# Patient Record
Sex: FEMALE | Race: WHITE
[De-identification: names, ages, dates, MRNs, and addresses within clinical notes are randomized per-mention and may not be internally consistent; named-entity substitution may affect disease eponyms.]

---

## 2019-08-17 ENCOUNTER — HOSPITAL ENCOUNTER (INPATIENT)
Dept: HOSPITAL 4 - SED | Age: 84
LOS: 5 days | Discharge: PSYCHIATRIC OR SUBSTANCE USE FACILITY | DRG: 551 | End: 2019-08-22
Attending: INTERNAL MEDICINE | Admitting: INTERNAL MEDICINE
Payer: COMMERCIAL

## 2019-08-17 VITALS — SYSTOLIC BLOOD PRESSURE: 147 MMHG

## 2019-08-17 VITALS — WEIGHT: 139 LBS | HEIGHT: 60 IN | BODY MASS INDEX: 27.29 KG/M2

## 2019-08-17 VITALS — SYSTOLIC BLOOD PRESSURE: 94 MMHG

## 2019-08-17 VITALS — SYSTOLIC BLOOD PRESSURE: 129 MMHG

## 2019-08-17 DIAGNOSIS — Z90.89: ICD-10-CM

## 2019-08-17 DIAGNOSIS — E11.9: ICD-10-CM

## 2019-08-17 DIAGNOSIS — Z85.6: ICD-10-CM

## 2019-08-17 DIAGNOSIS — E87.1: ICD-10-CM

## 2019-08-17 DIAGNOSIS — F03.90: ICD-10-CM

## 2019-08-17 DIAGNOSIS — R62.7: ICD-10-CM

## 2019-08-17 DIAGNOSIS — E43: ICD-10-CM

## 2019-08-17 DIAGNOSIS — G89.4: ICD-10-CM

## 2019-08-17 DIAGNOSIS — M47.896: Primary | ICD-10-CM

## 2019-08-17 DIAGNOSIS — G93.40: ICD-10-CM

## 2019-08-17 DIAGNOSIS — I48.2: ICD-10-CM

## 2019-08-17 DIAGNOSIS — E03.9: ICD-10-CM

## 2019-08-17 DIAGNOSIS — R29.6: ICD-10-CM

## 2019-08-17 DIAGNOSIS — I47.1: ICD-10-CM

## 2019-08-17 DIAGNOSIS — Z82.49: ICD-10-CM

## 2019-08-17 DIAGNOSIS — Z79.899: ICD-10-CM

## 2019-08-17 DIAGNOSIS — Z91.041: ICD-10-CM

## 2019-08-17 LAB
ALBUMIN SERPL BCP-MCNC: 2.4 G/DL (ref 3.4–4.8)
ALT SERPL W P-5'-P-CCNC: 5 U/L (ref 12–78)
AMYLASE SERPL-CCNC: 20 U/L (ref 0–100)
ANION GAP SERPL CALCULATED.3IONS-SCNC: 6 MMOL/L (ref 5–15)
AST SERPL W P-5'-P-CCNC: 9 U/L (ref 10–37)
BASOPHILS # BLD AUTO: 0 K/UL (ref 0–0.2)
BASOPHILS NFR BLD AUTO: 0.2 % (ref 0–2)
BILIRUB SERPL-MCNC: 0.7 MG/DL (ref 0–1)
BUN SERPL-MCNC: 12 MG/DL (ref 8–21)
CALCIUM SERPL-MCNC: 9.3 MG/DL (ref 8.4–11)
CHLORIDE SERPL-SCNC: 95 MMOL/L (ref 98–107)
CHOLEST SERPL-MCNC: 174 MG/DL (ref ?–200)
CREAT SERPL-MCNC: 0.59 MG/DL (ref 0.55–1.3)
EOSINOPHIL # BLD AUTO: 0 K/UL (ref 0–0.4)
EOSINOPHIL NFR BLD AUTO: 0.9 % (ref 0–4)
ERYTHROCYTE [DISTWIDTH] IN BLOOD BY AUTOMATED COUNT: 17 % (ref 9–15)
ETHANOL SERPL-MCNC: < 3 MG/DL (ref ?–10)
GFR SERPL CREATININE-BSD FRML MDRD: (no result) ML/MIN (ref 90–?)
GLUCOSE SERPL-MCNC: 109 MG/DL (ref 70–99)
HCT VFR BLD AUTO: 38.3 % (ref 36–48)
HDLC SERPL-MCNC: 42 MG/DL (ref 55–?)
HGB BLD-MCNC: 12.7 G/DL (ref 12–16)
INR PPP: 1 (ref 0.8–1.2)
LDL CHOLESTEROL: 108 MG/DL (ref ?–100)
LIPASE SERPL-CCNC: 59 U/L (ref 73–393)
LYMPHOCYTES # BLD AUTO: 0.4 K/UL (ref 1–5.5)
LYMPHOCYTES NFR BLD AUTO: 8.4 % (ref 20.5–51.5)
MCH RBC QN AUTO: 27 PG (ref 27–31)
MCHC RBC AUTO-ENTMCNC: 33 % (ref 32–36)
MCV RBC AUTO: 81 FL (ref 79–98)
MONOCYTES # BLD MANUAL: 0.3 K/UL (ref 0–1)
MONOCYTES # BLD MANUAL: 6.1 % (ref 1.7–9.3)
NEUTROPHILS # BLD AUTO: 4.5 K/UL (ref 1.8–7.7)
NEUTROPHILS NFR BLD AUTO: 84.4 % (ref 40–70)
PLATELET # BLD AUTO: 245 K/UL (ref 130–430)
POTASSIUM SERPL-SCNC: 4.2 MMOL/L (ref 3.5–5.1)
PROTHROMBIN TIME: 10.3 SECS (ref 9.5–12.5)
RBC # BLD AUTO: 4.75 MIL/UL (ref 4.2–6.2)
SODIUM SERPLBLD-SCNC: 127 MMOL/L (ref 136–145)
TRIGL SERPL-MCNC: 125 MG/DL (ref 30–150)
WBC # BLD AUTO: 5.3 K/UL (ref 4.8–10.8)

## 2019-08-17 PROCEDURE — G0378 HOSPITAL OBSERVATION PER HR: HCPCS

## 2019-08-17 PROCEDURE — G0481 DRUG TEST DEF 8-14 CLASSES: HCPCS

## 2019-08-17 PROCEDURE — G0482 DRUG TEST DEF 15-21 CLASSES: HCPCS

## 2019-08-17 RX ADMIN — SODIUM CHLORIDE SCH MLS/HR: 9 INJECTION, SOLUTION INTRAVENOUS at 16:30

## 2019-08-17 NOTE — NUR
CHANGE OF SHIFT;

pt. confused, restless, removed her tele cables, her gown and found on the floor. pt. able 
to verbally response, she knows her name. in no acute distress. on fall risk, safety 
precautions in place. call light at bedside.

## 2019-08-17 NOTE — NUR
NOTES:

pt. removed gown and tele monitor again. pt. talking to herself. reoriented. repositioned. 
IVF patent. closely monitored.

## 2019-08-17 NOTE — NUR
Patient will be admitted to care of Dr. Mcmanus.  Admitted to Telemetry unit.  
Will go to room 104B.  Belongings list completed.  Summary report printed. 
Report will be given at bedside.

## 2019-08-17 NOTE — NUR
ADMISSION NOTE

Received patient from ER via lokesh, received report from REMIGIO FREEMAN. Patient admitted with 
diagnosis of HYPONATREMIA. Patient oriented to hospital routine, call light, toileting and 
safety-patient verbalized understanding.

## 2019-08-17 NOTE — NUR
Patient is drowsy but arousable answer simple question,with continuos IV  fluid for 
hydration, safety/fall precaution initiated, will monitor.

## 2019-08-17 NOTE — NUR
Patient is more awake ate her dinner tolerates 50% with out aspiration, safety/fall 
precaution initiated verbalized understanding.

## 2019-08-17 NOTE — NUR
Patient bib BLS  after being dc from [facility] s/p fall five days ago. 
According BLS she ahs not been able to move since being dc'd from [facility] 
arrives with multiple c/o.
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
## 2019-08-17 NOTE — NUR
NOTES:

Dr. Mcmanus came and checked pt. pretty calm right now but informed MD that she gets restless 
on and off . pt. is disoriented but verbally responsive. safety precautions in place due to 
frequent falls.

## 2019-08-18 VITALS — SYSTOLIC BLOOD PRESSURE: 115 MMHG

## 2019-08-18 VITALS — SYSTOLIC BLOOD PRESSURE: 96 MMHG

## 2019-08-18 VITALS — SYSTOLIC BLOOD PRESSURE: 117 MMHG

## 2019-08-18 VITALS — SYSTOLIC BLOOD PRESSURE: 144 MMHG

## 2019-08-18 VITALS — SYSTOLIC BLOOD PRESSURE: 125 MMHG

## 2019-08-18 RX ADMIN — PANTOPRAZOLE SODIUM SCH MG: 40 TABLET, DELAYED RELEASE ORAL at 06:56

## 2019-08-18 RX ADMIN — NYSTATIN AND TRIAMCINOLONE ACETONIDE SCH GM: 100000; 1 CREAM TOPICAL at 21:00

## 2019-08-18 NOTE — NUR
DC PLANNING

Went to pt's bedside x2, in am & afternoon to do dc planning eval, both times pt sleeping, 
would not wake up on calling pt's name. CM/ to f/u when pt more awake.

## 2019-08-18 NOTE — NUR
CONSULTATION PAGED/CALLED

Reason for Consultation: []  PSYCHOSIS

Person Who was Notified: []  ANU

Consulting Physician: []   DR HITESH LORENZO

Consultant Specialty: []  PSYCH

Ordering Physician: []  DR JENNA MEIER

## 2019-08-18 NOTE — NUR
NOTE



PATIENT IS RESTING IN BED, STABLE, NO SIGNS OF RESPIRATORY DISTRESS. CALL LIGHT IS WITHIN 
REACH. BED IS LOCKED, ALARMED, AND AT THE LOWEST LEVEL.

## 2019-08-18 NOTE — NUR
CONSULTATION PAGED/CALLED

Reason for Consultation: AF

Person Who was Notified: TANIKA

Consulting Physician: HOWARD ROSENTHAL IS ON CALL

Consultant Specialty: 

Ordering Physician: HARDEEP

## 2019-08-18 NOTE — NUR
CLOSING NOTES;

pt. sleeping, in no acute distress. kept warm and comfortable. IV site intact. for further 
care and assistance. fall risk precautions. call light at bedside. pt. room close to nurses 
station.

## 2019-08-18 NOTE — NUR
INITIAL NOTE



AT INITIAL ASSESSMENT, PATIENT IS RESTING IN BED, STABLE, NO SIGNS OF RESPIRATORY DISTRESS. 
PATIENT VERBALIZES NO PAIN AT THIS TIME. PLAN OF CARE FOR THE EVENING IS COMMUNICATED WITH 
THE PATIENT. BED IS LOCKED, ALARMED, AND AT THE LOWEST LEVEL. FALL AND SAFETY PRECAUTIONS 
WILL BE TAKEN THROUGHOUT THE SHIFT. PATIENT IS REFUSING IV PLACEMENT AT THIS TIME, WILL 
CONTINUE TO ENCOURAGE.

## 2019-08-18 NOTE — NUR
NOTES:

pt. awakened, was upset but needs to change since gets incontinent of urine. complete roger 
care done, Z tia applied on both groin and under her breast. IV patent. repositioned.

## 2019-08-18 NOTE — NUR
NOTES:

 continue to monitor. safet measures in place. kept warm with blanket. pt. room close to 
nurses station.

## 2019-08-19 VITALS — SYSTOLIC BLOOD PRESSURE: 123 MMHG

## 2019-08-19 VITALS — SYSTOLIC BLOOD PRESSURE: 112 MMHG

## 2019-08-19 VITALS — SYSTOLIC BLOOD PRESSURE: 114 MMHG

## 2019-08-19 LAB
ALBUMIN SERPL BCP-MCNC: 1.8 G/DL (ref 3.4–4.8)
ALT SERPL W P-5'-P-CCNC: < 5 U/L (ref 12–78)
ANION GAP SERPL CALCULATED.3IONS-SCNC: 5 MMOL/L (ref 5–15)
AST SERPL W P-5'-P-CCNC: 14 U/L (ref 10–37)
BASOPHILS # BLD AUTO: 0 K/UL (ref 0–0.2)
BASOPHILS NFR BLD AUTO: 0.6 % (ref 0–2)
BILIRUB SERPL-MCNC: 0.4 MG/DL (ref 0–1)
BUN SERPL-MCNC: 13 MG/DL (ref 8–21)
CALCIUM SERPL-MCNC: 8.7 MG/DL (ref 8.4–11)
CHLORIDE SERPL-SCNC: 97 MMOL/L (ref 98–107)
CREAT SERPL-MCNC: 0.61 MG/DL (ref 0.55–1.3)
EOSINOPHIL # BLD AUTO: 0.1 K/UL (ref 0–0.4)
EOSINOPHIL NFR BLD AUTO: 2 % (ref 0–4)
ERYTHROCYTE [DISTWIDTH] IN BLOOD BY AUTOMATED COUNT: 17 % (ref 9–15)
GFR SERPL CREATININE-BSD FRML MDRD: (no result) ML/MIN (ref 90–?)
GLUCOSE SERPL-MCNC: 134 MG/DL (ref 70–99)
HCT VFR BLD AUTO: 32.4 % (ref 36–48)
HGB BLD-MCNC: 10.7 G/DL (ref 12–16)
LYMPHOCYTES # BLD AUTO: 0.4 K/UL (ref 1–5.5)
LYMPHOCYTES NFR BLD AUTO: 11.4 % (ref 20.5–51.5)
MCH RBC QN AUTO: 27 PG (ref 27–31)
MCHC RBC AUTO-ENTMCNC: 33 % (ref 32–36)
MCV RBC AUTO: 81 FL (ref 79–98)
MONOCYTES # BLD MANUAL: 0.3 K/UL (ref 0–1)
MONOCYTES # BLD MANUAL: 8.2 % (ref 1.7–9.3)
NEUTROPHILS # BLD AUTO: 2.8 K/UL (ref 1.8–7.7)
NEUTROPHILS NFR BLD AUTO: 77.8 % (ref 40–70)
PLATELET # BLD AUTO: 235 K/UL (ref 130–430)
POTASSIUM SERPL-SCNC: 4 MMOL/L (ref 3.5–5.1)
RBC # BLD AUTO: 3.99 MIL/UL (ref 4.2–6.2)
SODIUM SERPLBLD-SCNC: 126 MMOL/L (ref 136–145)
WBC # BLD AUTO: 3.6 K/UL (ref 4.8–10.8)

## 2019-08-19 RX ADMIN — NYSTATIN AND TRIAMCINOLONE ACETONIDE SCH GM: 100000; 1 CREAM TOPICAL at 20:04

## 2019-08-19 RX ADMIN — ACETAMINOPHEN PRN MG: 325 TABLET ORAL at 16:04

## 2019-08-19 RX ADMIN — NYSTATIN AND TRIAMCINOLONE ACETONIDE SCH GM: 100000; 1 CREAM TOPICAL at 09:03

## 2019-08-19 RX ADMIN — SODIUM CHLORIDE SCH MLS/HR: 9 INJECTION, SOLUTION INTRAVENOUS at 07:15

## 2019-08-19 RX ADMIN — SODIUM CHLORIDE SCH MLS/HR: 9 INJECTION, SOLUTION INTRAVENOUS at 07:14

## 2019-08-19 RX ADMIN — PANTOPRAZOLE SODIUM SCH MG: 40 TABLET, DELAYED RELEASE ORAL at 07:13

## 2019-08-19 RX ADMIN — HALOPERIDOL LACTATE PRN MG: 5 INJECTION, SOLUTION INTRAMUSCULAR at 04:25

## 2019-08-19 NOTE — NUR
Nutrition Update



Chao Scale 16 noted.

Pt admitted for Hyponatremia

Diet: Mechanical Soft

BMI: 27 kg/m2

RD to follow per nutrition care standards.

## 2019-08-19 NOTE — NUR
Physical Therapy order has been received and the chart reviewed. RN to clarify with MD any 
possible cervical spine precautions before attempting PT evaluation.

## 2019-08-19 NOTE — NUR
Closing Notes

Patient currently lying comfortably in her bed. Remain to be calm at this time. Denies any 
pain or discomfort at this time. Call light within the reach. Will endorse to next shift.

## 2019-08-19 NOTE — NUR
PM ASSESSMENT 



REPORT RECEIVED FROM AM RN. PT RECEIVED IN BED WITH EYES OPEN, CONFUSED. PT REQUIRES 
FREQUENT REORIENTATION. VSS, NO S/S OF ACUTE DISTRESS NOTED. PT ON RA. PT REFUSING TELEMETRY 
MONITOR, EDUCATION PROVIDED ON IMPORTANCE OF MONITORING PT STILL REFUSES. LFA 22G IN PLACE 
INFUSING NS @ 50 CC/HR. PT REFUSES TO WEAR SCDS. BECOMES AGGRESSIVE WHEN ATTEMPTING TO 
EDUCATE ABOUT PURPOSE OF SCDs. HOB ELEVATED, BED IN LOWEST POSITION, CALL LIGHT IN REACH. 
WILL CONTINUE TO MONITOR PT.

## 2019-08-19 NOTE — NUR
Patient noncompliant/Called the  

patient remains to be confused, patient refusing SCD's, refused telemetry monitor, patient 
called the 's department,  showed up and spoke with patient and reminded her 
where she is and that he cannot drive her home, patient agreed that she will stay for now. 
IV line is patent and infusing well, continuing to monitor, bed in lowest position, three 
side rails up, bed alarm on, bed close to nursing station.

## 2019-08-19 NOTE — NUR
RN ROUND

Remain to be alert, awake and verbally responsive. Able to verbalize needs and concerns. 
Denies any pain or discomfort at this time. IV infusing well. Call light within the reach. 
Fall precaution observed. Will continue to monitor.

## 2019-08-19 NOTE — NUR
Opening Notes

Patient received lying comfortably in her bed with respiration even and unlabored. Able to 
verbalize needs and concerns. Denies any pain or discomfort at this time. IV infusing well 
to left FA. Discussed to patient the plan of care, patient verbalized understanding. fall 
precaution observed. Call light within the reach. Will continue to monitor.

## 2019-08-19 NOTE — NUR
CONSULTATION CALLED FOR RYAN NASH FOR CONSULT OF C-SPINE FX ORDER BY CONSULT DR MEIER 
SPOKE WITH FRANCISCA

## 2019-08-19 NOTE — NUR
Lasix refused

patient educated on usage of lasix and its potential side effects, medication scanned then 
patient refused for the medication to be given, explained risks and benefits but patient 
still refused. Notified Dr. Mcmanus, with no new order at this time

## 2019-08-19 NOTE — NUR
RN ROUND

patient remain to be alert, awake and verbally responsive, resting well. Good pericare 
rendered by CNA. Educated patient on usage of Nystatin cream to her abdominal and breast 
fold, verbalized understanding. Applied Nystatin cream to her redness to abdominal and 
breastfold, well tolerated. Attended to needs and anticipated. Call light within the reach. 
Fall precaution observed.

## 2019-08-19 NOTE — NUR
CONSULTATION CALLED FOR MAURA RICE FOR CONSULT OF PARONOID ORDER BY DR MEIER SPOKE WITH 
MARA FACESHEET SENT OVER

## 2019-08-19 NOTE — NUR
RN ROUNDS 



PT REPOSITIONED IN BED. EULOGIO CARE DONE AND LINENS CHANGED WITH ASSIST OF CNA. PT CONTINUES 
TO REFUSE TELE BOX. WILL CONTINUE TO MONITOR PT.

## 2019-08-19 NOTE — NUR
Generalized Pain

Patient complained of generalized 3/10 pain, Tylenol 650 mg given as ordered, well 
tolerated. Will re-assess in an hour. Remain to be alert, awake and verbally responsive. 
Call light within the reach. Will continue to monitor.

## 2019-08-20 VITALS — SYSTOLIC BLOOD PRESSURE: 106 MMHG

## 2019-08-20 VITALS — SYSTOLIC BLOOD PRESSURE: 146 MMHG

## 2019-08-20 VITALS — SYSTOLIC BLOOD PRESSURE: 121 MMHG

## 2019-08-20 VITALS — SYSTOLIC BLOOD PRESSURE: 120 MMHG

## 2019-08-20 VITALS — SYSTOLIC BLOOD PRESSURE: 124 MMHG

## 2019-08-20 RX ADMIN — NYSTATIN AND TRIAMCINOLONE ACETONIDE SCH GM: 100000; 1 CREAM TOPICAL at 21:00

## 2019-08-20 RX ADMIN — PANTOPRAZOLE SODIUM SCH MG: 40 TABLET, DELAYED RELEASE ORAL at 06:20

## 2019-08-20 RX ADMIN — CARVEDILOL SCH MG: 3.12 TABLET, FILM COATED ORAL at 21:26

## 2019-08-20 RX ADMIN — SODIUM CHLORIDE SCH MLS/HR: 9 INJECTION, SOLUTION INTRAVENOUS at 16:31

## 2019-08-20 RX ADMIN — ACETAMINOPHEN PRN MG: 325 TABLET ORAL at 21:26

## 2019-08-20 RX ADMIN — NYSTATIN AND TRIAMCINOLONE ACETONIDE SCH GM: 100000; 1 CREAM TOPICAL at 14:09

## 2019-08-20 RX ADMIN — ACETAMINOPHEN PRN MG: 325 TABLET ORAL at 13:14

## 2019-08-20 RX ADMIN — FUROSEMIDE SCH MG: 20 TABLET ORAL at 08:04

## 2019-08-20 RX ADMIN — SODIUM CHLORIDE SCH MLS/HR: 9 INJECTION, SOLUTION INTRAVENOUS at 06:19

## 2019-08-20 NOTE — NUR
Tachycardia/Paged Dr. Suarez

Informed by monitor tech that patient's HR went up [id number] to 160 to 180, patient is 
currently sitting comfortably in her bed eating lunch, V/S BP - 137/77, HR- 181 , R-20 and 
02 sat at 97% on RA, no changes in LOC, denies any dizziness, asymptomatic. Dr Suarez was 
paged, Dr. Uriostegui is on-call. Awaiting for call back. Will continue to monitor.

## 2019-08-20 NOTE — NUR
Hygiene care

Patient had BM. Hygiene care provided. Patient tolerated well. No other needs at this time. 
Call light with the patient. Safety precautions in place.

## 2019-08-20 NOTE — NUR
RN ROUNDS 



PT RESTING COMFORTABLY IN BED WITH EYES CLOSED. BREATHING IS EVEN AND UNLABORED ON RA. WILL 
CONTINUE TO MONITOR PT.

## 2019-08-20 NOTE — NUR
Rn Round

Resting well in her bed. Denies any pain or discomfort at this time. Able to verbalize needs 
and concerns. Patient still refuse to put on her telemetry box, Fall precaution observed. 
Will continue to monitor.

## 2019-08-20 NOTE — NUR
Dr. Suarez call back

received a call back from Dr. Suarez, notified regarding patient's condition and tachycardia 
with new order for coreg 3.125 mg x 1 now then BID noted and carried out.

## 2019-08-20 NOTE — NUR
Opening notes

Received report. Patient is resting in bed. No signs of distress noted. Breathing even and 
unlabored. IV patent and intact infusing fluids. Patient is refusing telemetry box. Patient 
yells "I don't need it! I have never needed it! I don't want it!" Educated the importance of 
monitoring her heart rate and rhythm. Charge nurse made aware. No other needs at this time. 
Call light with the patient. Safety precautions in place.

## 2019-08-20 NOTE — NUR
Hygiene care

Patient had a large amount of soft brown stool. Hygiene care provided. Patient tolerated 
well. No other needs at this time. Call light with the patient. safety precautions in place.

## 2019-08-20 NOTE — NUR
Dr. Mcmanus rounds

assessed patient, informed MD that patient had runs of tachycardia and Coreg PO was 
administered to the patient per Dr. Suarez order, informed him that the recommendation was 
to provide an Aspen collar instead of a hard collar, and patient is refusing to wear 
telemetry box at this time, will follow up with any new orders.

## 2019-08-20 NOTE — NUR
ENDORSEMENT 



BEDSIDE REPORT GIVEN TO ASIF AND DELMIS RN USING SBAR APPROACH. PT IN STABLE CONDITION, NO S/S 
OF ACUTE DISTRESS NOTED. NO OTHER NEEDS NOTED PER PT.

## 2019-08-20 NOTE — NUR
Closing Notes

Patient currently lying comfortably in her bed with respiration even and unlabored. Remain 
to be alert, awake and verbally responsive. Denies any pain or discomfort at this time. call 
light within the reach. IVF infusing well. Will endorse to next shift.

## 2019-08-20 NOTE — NUR
Dc Planning: calling  patient's daughters for discharge planning assessment, 1. Radha # 
[phone] the line is disconnected. 2. LVM x2 to dtr Xavier # [phone].

## 2019-08-20 NOTE — NUR
RN ROUND

Resting well. Respiration even and unlabored. Fall precaution observed. Call light within 
easy reach.

## 2019-08-20 NOTE — NUR
Medications

given. Educated the action and side effects of medications. Patient verbalized understanding 
and tolerated well. No other needs. Call light with the patient. Safety precautions in 
place.

## 2019-08-20 NOTE — NUR
Opening Notes

Patient received lying comfortably in her bed with respiration even and unlabored. Alert, 
awake and verbally responsive. Denies any pain or discomfort at this time. IVF infusing 
well, IV line to left FA intact and patent. Call light within the reach. Fall precaution 
observed. Will continue to monitor.

## 2019-08-20 NOTE — NUR
Discharge Planning:

DCP faxed referrals to [facility] (f [phone] p [phone]), [facility] (f 
[phone] p [phone]),

[facility] (f [phone] p [phone]) DCP to follow up.

## 2019-08-20 NOTE — NUR
Coreg given/refusing tele box

Educated patient on coreg usage and its potential side effects, patient verbalized 
understanding, coreg given as ordered, well tolerated. Patient removed telemetry box and 
refused for it to be attached, offered 3x explained risks and benefits, patient still 
refused.

## 2019-08-20 NOTE — NUR
RN ROUNDS

Patient currently lying comfortably in her bed. resting well. Denies any pain or discomfort. 
fall precaution observed. Call light within the reach. Will continue to monitor.

## 2019-08-21 VITALS — SYSTOLIC BLOOD PRESSURE: 109 MMHG

## 2019-08-21 VITALS — SYSTOLIC BLOOD PRESSURE: 107 MMHG

## 2019-08-21 VITALS — SYSTOLIC BLOOD PRESSURE: 113 MMHG

## 2019-08-21 VITALS — SYSTOLIC BLOOD PRESSURE: 117 MMHG

## 2019-08-21 LAB
ANION GAP SERPL CALCULATED.3IONS-SCNC: 9 MMOL/L (ref 5–15)
BASOPHILS # BLD AUTO: 0 K/UL (ref 0–0.2)
BASOPHILS NFR BLD AUTO: 0.5 % (ref 0–2)
BUN SERPL-MCNC: 7 MG/DL (ref 8–21)
CALCIUM SERPL-MCNC: 8.5 MG/DL (ref 8.4–11)
CHLORIDE SERPL-SCNC: 107 MMOL/L (ref 98–107)
CREAT SERPL-MCNC: 0.54 MG/DL (ref 0.55–1.3)
EOSINOPHIL # BLD AUTO: 0.1 K/UL (ref 0–0.4)
EOSINOPHIL NFR BLD AUTO: 3.9 % (ref 0–4)
ERYTHROCYTE [DISTWIDTH] IN BLOOD BY AUTOMATED COUNT: 17 % (ref 9–15)
GFR SERPL CREATININE-BSD FRML MDRD: (no result) ML/MIN (ref 90–?)
GLUCOSE SERPL-MCNC: 115 MG/DL (ref 70–99)
HCT VFR BLD AUTO: 30 % (ref 36–48)
HGB BLD-MCNC: 10 G/DL (ref 12–16)
LYMPHOCYTES # BLD AUTO: 0.4 K/UL (ref 1–5.5)
LYMPHOCYTES NFR BLD AUTO: 16.6 % (ref 20.5–51.5)
MCH RBC QN AUTO: 27 PG (ref 27–31)
MCHC RBC AUTO-ENTMCNC: 33 % (ref 32–36)
MCV RBC AUTO: 80 FL (ref 79–98)
MONOCYTES # BLD MANUAL: 0.2 K/UL (ref 0–1)
MONOCYTES # BLD MANUAL: 9.2 % (ref 1.7–9.3)
NEUTROPHILS # BLD AUTO: 1.7 K/UL (ref 1.8–7.7)
NEUTROPHILS NFR BLD AUTO: 69.8 % (ref 40–70)
PLATELET # BLD AUTO: 285 K/UL (ref 130–430)
POTASSIUM SERPL-SCNC: 3.6 MMOL/L (ref 3.5–5.1)
RBC # BLD AUTO: 3.74 MIL/UL (ref 4.2–6.2)
SODIUM SERPLBLD-SCNC: 140 MMOL/L (ref 136–145)
WBC # BLD AUTO: 2.4 K/UL (ref 4.8–10.8)

## 2019-08-21 RX ADMIN — FUROSEMIDE SCH MG: 20 TABLET ORAL at 08:36

## 2019-08-21 RX ADMIN — NYSTATIN AND TRIAMCINOLONE ACETONIDE SCH GM: 100000; 1 CREAM TOPICAL at 08:35

## 2019-08-21 RX ADMIN — PANTOPRAZOLE SODIUM SCH MG: 40 TABLET, DELAYED RELEASE ORAL at 06:05

## 2019-08-21 RX ADMIN — ACETAMINOPHEN PRN MG: 325 TABLET ORAL at 03:41

## 2019-08-21 RX ADMIN — CARVEDILOL SCH MG: 3.12 TABLET, FILM COATED ORAL at 21:41

## 2019-08-21 RX ADMIN — SODIUM CHLORIDE SCH MLS/HR: 9 INJECTION, SOLUTION INTRAVENOUS at 13:19

## 2019-08-21 RX ADMIN — NYSTATIN AND TRIAMCINOLONE ACETONIDE SCH GM: 100000; 1 CREAM TOPICAL at 21:41

## 2019-08-21 RX ADMIN — CARVEDILOL SCH MG: 3.12 TABLET, FILM COATED ORAL at 08:35
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
## 2019-08-21 NOTE — NUR
Notes-Pt was seen by PT at bedside. Pt continues to refuse to wear [place]. No acute distress 
noted. Denies any pain at this time.

## 2019-08-21 NOTE — NUR
Patient agreed to trial fitting of the Aspen collar. She demanded that it be removed because 
it is uncomfortable. Brace/collar fits well and maintains the cervical spine in good 
alingment. Discussed the benefits of the brace, however, the patient is adamant that she 
does not want to wear it. She verbalizes that she understands its purpose. Informed 
patient's nurse. Plan: Physical Therapy evaluation is held until Neuro consult/MD clearance 
to attempt out of bed activities without the cervical brace.

## 2019-08-21 NOTE — NUR
Discharge Planning:

DCP followed up-

[facility] (f [phone] p [phone]) Accepting

[facility] (f [phone] p [phone]),

[facility] (f [phone] p [phone]) accepting

## 2019-08-21 NOTE — NUR
Initial Note:

Pt awake and confused. Is incontinent of stool and urine, cleaned pt. Refused tele monitor 
and SCD's at this time. Will attempt to put aspen collar as ordered. IVF fluids infusing 
well on left hand. Denies any pain, or SOB at this time. Safety precautions in place, bed 
alarm on, call light within reach and encourage pt to use.

## 2019-08-21 NOTE — NUR
NOTES-PT AWAKE, RESTING IN BED. PT WAS INCONTINENT OF URINE, CHANGED PT BEDDING AND 
REPOSITIONED. NO ACUTE DISTRESS NOTED. PT DENIES ANY PAIN OR DISCOMFORT.

## 2019-08-21 NOTE — NUR
CLOSING NOTE-PT AWAKE, EATING DINNER. NO ACUTE DISTRESS NOTED. DENIES ANY PAIN OR DISCOMFORT 
AT THIS TIME. REPOSITIONED PT. BED IN LOWEST POSITION, CALL LIGHT WITHIN REACH AND 
ENCOURAGED TO USE FOR ASSISTANCE. WILL CONTINUE TO MONITOR UNTIL PT CARE IS ENDORSED TO 
NIGHT SHIFT RN.

## 2019-08-21 NOTE — NUR
NOTES-PT AWAKE, EATING IN BED. NO ACUTE DISTRESS NOTED. DENIES ANY PAIN OR DISCOMFORT. PT 
STILL REFUSES TO WEAR ASPEN COLLAR. WILL CONTINUE TO MONITOR.

## 2019-08-21 NOTE — NUR
Sleeping

Patient sleeping at this time. No signs of distress noted. Breathing even and unlabored. IVF 
infusing well. Call light with the patient. Safety precautions in place.

## 2019-08-21 NOTE — NUR
FOLLOWED UP:

FOLLOWED UP CONSULT FOR DR. PALAICOS

I SPOKE WITH CORE[facility] EXCHANGE

CONSULT WAS CALLED ON 8/19/19

DR. PALACIOS HAS NOT CAME TO SEE THE PATIENT YET.

## 2019-08-21 NOTE — NUR
CONSULTATION PAGED/CALLED

Reason for Consultation: C-SPINE FX

Person Who was Notified: SPOKE WITH FRANCISCA FROM  OFFICE

Consulting Physician:  

Consultant Specialty: SURGEON

Ordering Physician:

## 2019-08-21 NOTE — NUR
IV re-insertion:



24 gauge IV reinserted to patient's right forearm by LYDIA Liriano. Site is secured with gauze 
wrap and paper tape. IV fluids resumed as ordered. Patient was instructed not to touch IV 
site, but became verbally aggressive towards LYDIA Liriano. Call light with patient. Safety and 
fall precautions in place. Will continue to monitor.

## 2019-08-21 NOTE — NUR
Initial note:



Received report from dayshift RN. Patient is awake watching TV. Alert and oriented to name 
only. No acute distress. Tolerating room air. IV site infusing well to left forearm, no 
infiltration noted. Bed is locked in lowest position, side rails raised x3, bed alarm on, 
room is close to nurses' station. Call light with patient. Will continue with plan of care.

## 2019-08-21 NOTE — NUR
Closing notes

Patient is resting in bed. No signs of distress noted. Breathing even and unlabored. IVF 
infusing well. All needs met throughout the shift. Call light with the patient. Safety 
precautions in place. Will endorse care to day shift RN.

## 2019-08-21 NOTE — NUR
Resting/Pain meds

Patient awake in bed. No signs of distress noted. Breathing even and unlabored. Patient 
still refuses telemetry monitor. Patient complains of pain to abdomen. PRN pain med given. 
No other needs. Call light with the patient. Safety precautions in place.

## 2019-08-21 NOTE — NUR
NOTES-PT AWAKE, RESTING IN BED. NO ACUTE DISTRESS NOTED. DENIES ANY PAIN OR DISCOMFORT AT 
THIS TIME. GAVE TEA TO DRINK AND REPOSITIONED PT. WILL CONTINUE TO MONITOR.

## 2019-08-22 ENCOUNTER — HOSPITAL ENCOUNTER (INPATIENT)
Dept: HOSPITAL 36 - GERO | Age: 84
LOS: 18 days | Discharge: NURSING, REHAB OR LONG-TERM CARE | DRG: 885 | End: 2019-09-09
Attending: PSYCHIATRY & NEUROLOGY | Admitting: PSYCHIATRY & NEUROLOGY
Payer: MEDICARE

## 2019-08-22 VITALS — SYSTOLIC BLOOD PRESSURE: 114 MMHG

## 2019-08-22 VITALS — DIASTOLIC BLOOD PRESSURE: 74 MMHG | SYSTOLIC BLOOD PRESSURE: 125 MMHG

## 2019-08-22 VITALS — SYSTOLIC BLOOD PRESSURE: 140 MMHG

## 2019-08-22 VITALS — SYSTOLIC BLOOD PRESSURE: 121 MMHG

## 2019-08-22 DIAGNOSIS — F03.91: ICD-10-CM

## 2019-08-22 DIAGNOSIS — M19.90: ICD-10-CM

## 2019-08-22 DIAGNOSIS — L98.8: ICD-10-CM
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
## 2019-08-22 DIAGNOSIS — K21.9: ICD-10-CM

## 2019-08-22 DIAGNOSIS — I48.91: ICD-10-CM
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
## 2019-08-22 DIAGNOSIS — E46: ICD-10-CM

## 2019-08-22 DIAGNOSIS — E03.9: ICD-10-CM

## 2019-08-22 DIAGNOSIS — M19.011: ICD-10-CM
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
## 2019-08-22 DIAGNOSIS — F29: Primary | ICD-10-CM

## 2019-08-22 PROCEDURE — X3904: HCPCS

## 2019-08-22 PROCEDURE — Z7610: HCPCS

## 2019-08-22 RX ADMIN — FUROSEMIDE SCH MG: 20 TABLET ORAL at 08:23

## 2019-08-22 RX ADMIN — PANTOPRAZOLE SODIUM SCH MG: 40 TABLET, DELAYED RELEASE ORAL at 06:24

## 2019-08-22 RX ADMIN — SODIUM CHLORIDE SCH MLS/HR: 9 INJECTION, SOLUTION INTRAVENOUS at 15:15

## 2019-08-22 RX ADMIN — CARVEDILOL SCH MG: 3.12 TABLET, FILM COATED ORAL at 08:20

## 2019-08-22 RX ADMIN — FUROSEMIDE SCH MG: 20 TABLET ORAL at 08:19

## 2019-08-22 RX ADMIN — HALOPERIDOL LACTATE PRN MG: 5 INJECTION, SOLUTION INTRAMUSCULAR at 03:09

## 2019-08-22 RX ADMIN — NYSTATIN AND TRIAMCINOLONE ACETONIDE SCH GM: 100000; 1 CREAM TOPICAL at 08:20

## 2019-08-22 NOTE — NUR
NOTES

patient is resting in bed awake at this time, patient is reading through notebook, patient 
states she needs to go home, educated patient on plan of care, will continue to monitor, 
safety precautions  in place, call light within reach.
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
## 2019-08-22 NOTE — NUR
Transfer to [facility]

Brain from [facility], p [phone] f [phone], called. 
They will accept patient to room 58A. Call report prior to transfer to above number. 

Notified daughter, Diane [phone], of transfer.

Staff from [facility] stated they will still be willing to accept patient after 
psych placement, even if patient requires long term placement.

[facility], [phone], will transport patient at 17:00, or sooner if 
available.

Notified Annika FREEMAN of above.

## 2019-08-22 NOTE — NUR
PT TRANSFERRED 

Report given to DEANN at [facility]. Transfer packet with Transfer Orders and 
Medication Reconciliation form given to EMT with report. Exitcare provided. [facility] ID band 
removed, replaced with ID band with pt's name and .  IV catheter removed, intact and 
dressing applied, no active bleeding. All belongings sent with patient. Patient left floor 
via gurney escorted by EMT in no distress.

## 2019-08-22 NOTE — NUR
Closing note:



Patient is sitting up in bed, not in acute distress. Tolerating room air. IV site to right 
forearm is patent, benign, receiving IV fluids well. All needs met. Safety and fall 
precautions observed. Hourly rounding performed throughout shift. Will endorse care to 
dayshift RN.

## 2019-08-22 NOTE — NUR
NOTES

patient is resting in bed awake at this time, patient denies any acute distress or pain, 
breathing is even and unlabored on room air, patient changed for transfer, will continue to 
monitor, safety precautions in place, sitter at bedside.

## 2019-08-22 NOTE — NUR
AGITATED:

PT IS VERY AGITATED ,GETTING OUT OF BED , SAYING "I WANTS TO GO OUT AND GET MY STUFF FROM MY 
APARTMENT", PT IS PULLING HER IV , TRIED TO EDUCATE THE PT , PT DOESNT WANT TO LISTEN ,  
PRIMARY RN ON BREAK ;CNA , A.N ,C.N AND SECURITY  AT BEDSIDE ; AS PER ORDER HALDOL 2 MG IM 
GIVEN TO LEFT DORSOGLUTEAL SITE . PT TOLERATED IT WELL , C.N AND C.N ARE STILL AT BEDSIDE . 
WILL CONTINUE TO MONITOR PT . 

PT IS INCONTINENT WITH URINE , PT CLEANED , LINEN AND GOWN CHANGED ; PT TURNED AND 
REPOSITIONED .

## 2019-08-22 NOTE — NUR
Dietitian Recommendations



* Recommend mechanical soft diet, Ensure Enlive BID

(ONS provides 700 kcal/day, 40 gm protein/day)

ADI MCDONALD



Please refer to Nutrition Assessment for details.

-------------------------------------------------------------------------------

Addendum: 08/22/19 at 1455 by Radha Sanchez RD

-------------------------------------------------------------------------------

Amended: Links added.

-------------------------------------------------------------------------------

Addendum: 08/22/19 at 1458 by Radha Sanchez RD

-------------------------------------------------------------------------------

CORRECTION:



Dietitian Recommendations



* Recommend mechanical soft, finely chopped diet, Ensure Enlive BID

(ONS provides 700 kcal/day, 40 gm protein/day)

ADI MCDONALD

## 2019-08-22 NOTE — NUR
NOTES

patient cleaned for episode of stool and urine incontinence, patient tolerated well and is 
able to turn well, patient denies any other acute distress or pain, breathing is even and 
unlabored on room air, IVF infusing as ordered, will continue to monitor, safety precautions 
in place, call light within reach.

## 2019-08-22 NOTE — NUR
Rounds:



Patient is resting comfortably in bed, no acute distress noted. Respiration are even, 
unlabored on room air. IV fluids infusing well to right forearm, IV site is patent and 
intact, no infiltration noted. Call light with patient. Safety, fall precautions in place. 
Will continue monitoring.

## 2019-08-22 NOTE — NUR
PATIENT ROOM CHANGED

patient moved to room 120C after voicing suicidal ideations with a plan, educated patient on 
plan of care, no acute distress or pain noted, breathing is even and unlabored on room air, 
will continue to monitor, sitter at bedside.

## 2019-08-22 NOTE — NUR
Need Family Contact

Patient referred to  for help in finding a family contact.

SORAYA reviewed the chart. Voicemails have been left for daughter, Maryellen Park, 
[phone]. The number for daughter, Radha Harry, [phone], does not connect. 

The Admission Assessment noted a number for Radha, [phone]. That number does not work.

Noted PCP, Willi Kruse [phone]. Spoke with a staff member there and was provided 
with the same number for Xavier, but a new number for Radha, [phone]. Phoned that number 
and left a voicemail requesting an urgent call back with no patient identifying information. 
The message stated that it was the number for someone called Michelle.

Noted patient was recently discharged from [facility], [phone]. Phoned the 
Social Work department and left a voicemail message requesting a call back with phone 
numbers for patient's family.

-------------------------------------------------------------------------------

Addendum: 08/22/19 at 1040 by Sadie Smith [facility]

-------------------------------------------------------------------------------

Patient resides at [facility] [phone]. Phoned Liz Roldan, the 
manager there. They have no good phone numbers for the patient's daughters. They have tried 
to reach them many times. Aidee, at [facility] [phone], arranged for transport home after the 
recent discharge.

-------------------------------------------------------------------------------

Addendum: 08/22/19 at 1109 [facility]

-------------------------------------------------------------------------------

Umm , patient's daughter called. She has been out of town. Spoke with Monster ROMAN about 
SNF.

Immediately after the call Gabrielle FREEMAN called stating patient has been placed on a [id number] hold. 
Phoned Umm back and informed her of possible pending psych placement. Will work on 
placement.

## 2019-08-22 NOTE — NUR
Agitation:



Per LYDIA Liriano, Haldol 2 MG was administered intramuscularly as patient had become 
increasingly agitated. A diaper was applied to patient per her request. She was educated 
regarding hospital policy, but still wanted a diaper. Patient stated that she uses [facility] 
when she is at home. Patient is awake in bed at this time, appears calm, not in any acute 
distress. Call light is with patient. Safety and fall precautions in place. Will continue 
monitoring.

## 2019-08-22 NOTE — NUR
[facility] Pl[facility] trying [facility] for 
possible placement. Phoned Laith at [facility], p [phone] f [phone], who stated they 
may have beds available this afternoon and requested patient's information be faxed, which 
was done. Waiting on neurosurgery consult before patient can be discharged.

-------------------------------------------------------------------------------

Addendum: 08/22/19 at 1455 by Sadie Smith LCSW

-------------------------------------------------------------------------------

Was made aware that patient has been cleared by neurosurgery to be discharged no neck brace. 
Phoned Laith at [facility] about 14:00. He will check patient's insurance status and with 
admitting. Awaiting call back.

## 2019-08-23 LAB
CHOLEST SERPL-MCNC: 145 MG/DL (ref ?–200)
HDLC SERPL-MCNC: 27 MG/DL (ref 23–92)
TRIGL SERPL-MCNC: 145 MG/DL (ref ?–150)

## 2019-08-23 RX ADMIN — LEVOTHYROXINE SODIUM SCH MG: 100 TABLET ORAL at 06:47

## 2019-08-23 RX ADMIN — PANTOPRAZOLE SODIUM SCH MG: 40 TABLET, DELAYED RELEASE ORAL at 09:25

## 2019-08-23 NOTE — HISTORY & PHYSICAL
ADMIT DATE:  08/22/2019



HISTORY OF PRESENT ILLNESS:  The patient is an 84-year-old female with long

history of hypothyroidism, degenerative joint disease, gastroesophageal reflux,

dementia, admitted to [facility] with mechanical fall, altered

level of consciousness.  The patient was seen by neurosurgeon, cardiologist and

psychiatrist.  The patient was transferred to [facility] at [facility]. 

The patient denies chest pain, shortness of breath, nausea, vomiting, fever or

chills.



PAST MEDICAL HISTORY:  Significant for hypothyroidism, gastroesophageal reflux,

degenerative joint disease, dementia.



PAST SURGICAL HISTORY:  No recent surgery.



ALLERGIES:  IODINE and ADHESIVE TAPE.



SOCIAL HISTORY:  No smoking, no alcohol, no drug.



FAMILY HISTORY:  Noncontributory.



MEDICATIONS:  Follow admission reconciliation.



REVIEW OF SYSTEMS:

RENAL SYSTEM:  No history of chronic renal disorder.

CARDIOVASCULAR SYSTEM:  No coronary artery disease.

ENDOCRINE SYSTEM:  She has history of hypothyroidism.

GASTROINTESTINAL SYSTEM:  She has gastroesophageal reflux.

NEUROLOGICAL SYSTEM:  No seizure disorder.

MUSCULOSKELETAL SYSTEM:  She has degenerative joint disease.

HEMATOLOGICAL SYSTEM:  No bleeding tendencies.

RESPIRATORY SYSTEM:  No asthma.

GENITOURINARY:  No dysuria or hematuria.



PHYSICAL EXAMINATION:

GENERAL:  She is awake, alert, not coherent.

VITAL SIGNS:  Temperature is 97.9, heart rate 99, blood pressure 125/60.

HEENT:  Normocephalic.  Pupils reactive to light and accommodation.  Sclerae

clear.

NECK:  Supple.  Negative for lymphadenopathy, JVD or bruit.

CHEST:  Entry of air bilaterally normal.  No rhonchi or wheezing.

HEART:  S1, S2 normal.  No gallop rhythm.

ABDOMEN:  Soft, bowel sounds positive.

EXTREMITIES:  No edema.

BACK:  Normal vertebra.

SKIN:  Significant for multiple redness of the inguinal area and lower

extremities.

NEUROLOGIC:  She is awake, alert, no focal muscle deficits.



ASSESSMENT:

1.  Hypothyroidism.

2.  Degenerative joint disease.

3.  Gastroesophageal reflux.

4.  Dementia.



PLAN:  The patient in the hospital under Dr. Patterson's service.  Medical problem

addressed during this hospitalization is dementia.  Medical problems addressed

at discharge are hypothyroidism, degenerative joint disease, gastroesophageal

reflux.  The patient is medically stable for activity.  The patient is a full

code.



Thank you Dr. Patterson for asking me to see your patient.





DD: 08/23/2019 18:59

DT: 08/23/2019 19:24

JOB# [id number]  [id number]

## 2019-08-23 NOTE — NUR
: Follow up from APS referral MSW made on 8/21/19

MSW called APS worker Chastity Fuller at [phone] re. Referral #[id number] for PtHarshad Fuller 
stated MSW's full report was not saved  nor sent. MSW will re-submit referral. 



MSW re-submitted APS referral with a # [id number].

## 2019-08-23 NOTE — PSYCHIATRIC EVALUATION
DATE OF SERVICE:  08/22/2019



PSYCHIATRIC INITIAL EVALUATION AND MENTAL STATUS EXAM



PATIENT'S AGE:  84.



SEX:  Female.



PHYSICIAN:  Dr. Patterson.



CHIEF COMPLAINT:  "I was kidnapped."



HISTORY OF PRESENT ILLNESS:  The patient is an 84-year-old female who I

evaluated in [facility] because of agitation and irritability.

 The patient was calling 911 tell them that she was kidnapped while she is in

the hospital.  The patient also was confused and easily agitated and easily

irritable.  She also was not able to provide any safe plan for her self-care and

the patient said that she has 2 daughters live in other state and that they

cannot be contacted while  in the hospital said that her daughter

lives close by and she was involved within her treatment.  The patient also was

getting more agitated when tried to talk to her about her current living

situation and that she needed to be on a safe environment, but she was not

accepting that she was getting more agitated.  The patient was placed on hold

for grave disability and transferred to the hospital.



PAST PSYCHIATRIC HISTORY:  The patient has a history of what seems to be slight

dementia and psychosis.  The patient is currently taking Remeron and Seroquel.



PAST MEDICAL HISTORY:  The patient has a history of neck pain.  She also has a

history of hypothyroidism, atrial fibrillation, degenerative disk and joint

disease in her spine and also history of subdural hematoma.



SOCIAL HISTORY:  The patient lives by herself in a Senior Living.  The patient

said that she has 2 daughters, but her information not accurate.  Denies any

alcohol or street drug use.



ALLERGIES:  No known allergies.



MENTAL STATUS EXAMINATION:  The patient appears slightly older than her stated

age.  Anxious.  Irritable mood.  Suspicious and paranoid.  Angry. 

Uncooperative.  Thought processes are circumstantial with occasional flight of

ideas.  The patient seems to be preoccupied.  She is also delusional and

thinking that she was kidnapped.  The patient is alert and oriented to

situation, but not to place or person or date.  Impaired immediate and recent

memory, but intact remote memory.  Poor insight and poor judgment.



ASSESSMENT:

PRIMARY DIAGNOSIS:  Unspecified psychosis.



SECONDARY DIAGNOSIS:  Rule out dementia, mild to moderate, with psychotic

features and behavioral disturbances.



MEDICAL DIAGNOSES:

1.  Hypothyroidism.

2.  Degenerative joint disease.  Status post subdural hematoma and neurological

consult shows no neurological deficits.



TREATMENT PLAN:  We will monitor the patient's behavior and condition closely. 

We will continue current psychotropic medications.  Also, we will work on

behavioral modification.



ESTIMATED LENGTH OF STAY:  5-7 days.



PATIENT'S STRENGTHS AND WEAKNESSES:  The patient's strength is not clear at this

time.  Weaknesses are ineffective coping.



AFTER DISCHARGE PLAN:  The patient was planned to go to [facility] for rehabilitation with plans for treatment there after her medical

stable in [facility].



CRITERIA FOR DISCHARGE:  The patient will not be psychotic and will stabilize

psychotropic medications and will establish outpatient treatment plans.





DD: 08/23/2019 05:43

DT: 08/23/2019 06:36

JOB# [id number]  [id number]

## 2019-08-24 LAB — HBA1C BLD-MCNC: 5.2 % (ref 4.8–5.6)

## 2019-08-24 RX ADMIN — LEVOTHYROXINE SODIUM SCH MG: 100 TABLET ORAL at 06:43

## 2019-08-24 RX ADMIN — NYSTATIN SCH APPL: 100000 CREAM TOPICAL at 09:05

## 2019-08-24 RX ADMIN — PANTOPRAZOLE SODIUM SCH MG: 40 TABLET, DELAYED RELEASE ORAL at 08:34

## 2019-08-24 NOTE — INTERNAL MEDICINE PROG NOTE
Internal Medicine Subjective





- Subjective


Service Date: 08/24/19


Patient seen and examined:: with staff (SHE FEELS WELL)


Patient is:: verbal, in bed, confused


Per staff patient has:: no adverse event





Internal Medicine Objective





- Results


Recent Labs: 


 Laboratory Last Values











Triglycerides  145 mg/dL (<150)   08/23/19  06:48    


 


Cholesterol  145 mg/dL (<200)   08/23/19  06:48    


 


LDL Cholesterol Direct  103 mg/dL ()   08/23/19  06:48    


 


HDL Cholesterol  27 mg/dL (23-92)   08/23/19  06:48    














- Physical Exam


Vitals and I&O: 


 Vital Signs











Temp  98.4 F   08/24/19 14:28


 


Pulse  99   08/24/19 17:14


 


Resp  18   08/24/19 14:28


 


BP  102/71   08/24/19 17:14


 


Pulse Ox  96   08/24/19 14:28








 Intake & Output











 08/23/19 08/24/19 08/24/19





 18:59 06:59 18:59


 


Intake Total 900  


 


Balance 900  


 


Intake:   


 


  Oral 900  


 


Other:   


 


  # Voids 4  3


 


  # Bowel Movements 0  0


 


  Stool Characteristics Formed Formed 





 Brown Brown 











Active Medications: 


Current Medications





Acetaminophen (Tylenol)  650 mg PO Q4HR PRN


   PRN Reason: Mild Pain / Temp above 100


   Stop: 10/21/19 21:31


Al Hydrox/Mg Hydrox/Simethicone (Maalox)  30 ml PO Q4HR PRN


   PRN Reason: GI DISTRESS


   Stop: 10/21/19 21:31


Carvedilol (Coreg)  3.125 mg PO BID [facility]


   Stop: 10/21/19 22:14


   Last Admin: 08/24/19 17:14 Dose:  3.125 mg


Furosemide (Lasix)  20 mg PO DAILY [facility]


   Stop: 10/22/19 08:59


   Last Admin: 08/24/19 08:34 Dose:  20 mg


Haloperidol Lactate (Haldol)  2 mg IM Q6HR PRN


   PRN Reason: Agitation


   Stop: 10/21/19 21:40


Levothyroxine Sodium (Synthroid)  0.175 mg PO QDAC [facility]


   Stop: 10/22/19 07:29


   Last Admin: 08/24/19 06:43 Dose:  0.175 mg


Lorazepam (Ativan)  0.5 mg PO Q4HR PRN; Protocol


   PRN Reason: Anxiety


   Stop: 09/21/19 21:31


Magnesium Hydroxide (Milk Of Magnesia)  30 ml PO HS PRN


   PRN Reason: Constipation


Mirtazapine (Remeron)  15 mg PO HS [facility]; Protocol


   Stop: 10/22/19 20:59


   Last Admin: 08/23/19 21:29 Dose:  15 mg


Multivitamins/Vitamin C (Theragran)  1 tab PO DAILY [facility]


   Stop: 10/22/19 08:59


   Last Admin: 08/24/19 08:34 Dose:  1 tab


Nystatin (Mycostatin Cream)  1 appl TP DAILY [facility]


   Stop: 09/23/19 08:59


   Last Admin: 08/24/19 09:05 Dose:  1 appl


Pantoprazole Sodium (Protonix)  40 mg PO DAILY [facility]


   Stop: 10/22/19 08:59


   Last Admin: 08/24/19 08:34 Dose:  40 mg


Pregabalin (Lyrica)  50 mg PO BID [facility]


   Stop: 10/22/19 08:59


   Last Admin: 08/24/19 17:15 Dose:  50 mg


Quetiapine Fumarate (Seroquel)  25 mg PO HS [facility]; Protocol


   Stop: 10/22/19 20:59


   Last Admin: 08/23/19 21:29 Dose:  25 mg


Tramadol HCl (Ultram)  50 mg PO Q6HR PRN


   PRN Reason: Pain (Severe)


   Stop: 10/22/19 18:41


Trazodone HCl (Desyrel)  50 mg PO HS [facility]; Protocol


   Stop: 10/22/19 20:59


   Last Admin: 08/23/19 21:28 Dose:  50 mg


Zolpidem Tartrate (Ambien)  5 mg PO HS PRN


   PRN Reason: Insomnia


   Stop: 10/21/19 21:31








General: alert


HEENT: NC/AT, PERRLA, EOMI, anicteric sclerae, throat clear


Neck: Supple, No JVD, No thyromegaly, +2 carotid pulse wo bruit, No LAD


Cardiovascular: RRR, Normal S1, Normal S2, without murmur


Abdomen: soft, non-tender, non-distended


Extremities: clear


Neurological: no change





Internal Medicine Assmt/Plan





- Assessment


Assessment: 





1.HYPOTHYROIDISM.


2.DJD.


3.PSYCHOSIS





- Plan


Plan: 





CONTINUE ON CURRENT MEDICATION AND DIET

## 2019-08-24 NOTE — PROGRESS NOTES
DATE:  08/24/2019



The patient was seen and evaluated.  The patient's chart reviewed.



Covering for Dr. Patterson.



IDENTIFYING DATA:  An 84-year-old female brought here from [facility] for agitation and irritability, presented very confused and aggressive,

believing that she is currently being kidnapped.



Today on face-to-face evaluation, the patient makes incoherent statements at

times, disorganized, very difficult to understand what she is saying, easily

agitated, disengaged in her treatment.



MENTAL STATUS EXAMINATION:  Uncooperative, circumstantial, flight of ideas,

internally preoccupied and delusional.



CURRENT MEDICATIONS:  Coreg, Lasix, Haldol, mirtazapine, quetiapine 25 mg p.o.

at bedtime, tramadol, trazodone, Ambien as needed.



ASSESSMENT AND PLAN:  Though that the patient has still ongoing disorganized

state, we will continue monitoring and evaluating as she is unable to clarify a

safe disposition plan as she continues to present disorganized and needing a lot

of redirection.





DD: 08/24/2019 11:01

DT: 08/24/2019 19:54

JOB# [id number]  [id number]

## 2019-08-25 LAB
ALBUMIN SERPL-MCNC: 2.9 GM/DL (ref 3.7–5.3)
ALBUMIN/GLOB SERPL: 1.1 {RATIO} (ref 1–1.8)
ALP SERPL-CCNC: 77 U/L (ref 34–104)
ALT SERPL-CCNC: 4 U/L (ref 7–52)
ANION GAP SERPL CALC-SCNC: 12.3 MMOL/L (ref 7–16)
AST SERPL-CCNC: 8 U/L (ref 13–39)
BASOPHILS # BLD AUTO: 0 TH/CUMM (ref 0–0.2)
BASOPHILS NFR BLD AUTO: 0.4 % (ref 0–2)
BILIRUB SERPL-MCNC: 0.3 MG/DL (ref 0.3–1)
BUN SERPL-MCNC: 7 MG/DL (ref 7–25)
CALCIUM SERPL-MCNC: 8.4 MG/DL (ref 8.6–10.3)
CHLORIDE SERPL-SCNC: 97 MEQ/L (ref 98–107)
CO2 SERPL-SCNC: 25.4 MEQ/L (ref 21–31)
CREAT SERPL-MCNC: 0.5 MG/DL (ref 0.6–1.2)
EOSINOPHIL # BLD AUTO: 0.1 TH/CMM (ref 0.1–0.4)
EOSINOPHIL NFR BLD AUTO: 1.1 % (ref 0–5)
ERYTHROCYTE [DISTWIDTH] IN BLOOD BY AUTOMATED COUNT: 16 % (ref 11.5–20)
GLOBULIN SER-MCNC: 2.7 GM/DL
GLUCOSE SERPL-MCNC: 204 MG/DL (ref 70–105)
HCT VFR BLD CALC: 33.6 % (ref 41–60)
HGB BLD-MCNC: 11.4 GM/DL (ref 12–16)
LYMPHOCYTE AB SER FC-ACNC: 0.7 TH/CMM (ref 1.5–3)
LYMPHOCYTES NFR BLD AUTO: 11.1 % (ref 20–50)
MCH RBC QN AUTO: 26.8 PG (ref 27–31)
MCHC RBC AUTO-ENTMCNC: 33.9 PG (ref 28–36)
MCV RBC AUTO: 79.1 FL (ref 81–100)
MONOCYTES # BLD AUTO: 0.6 TH/CMM (ref 0.3–1)
MONOCYTES NFR BLD AUTO: 9 % (ref 2–10)
NEUTROPHILS # BLD: 5 TH/CMM (ref 1.8–8)
NEUTROPHILS NFR BLD AUTO: 78.4 % (ref 40–80)
PLATELET # BLD: 466 TH/CMM (ref 150–400)
POTASSIUM SERPL-SCNC: 3.7 MEQ/L (ref 3.5–5.1)
RBC # BLD AUTO: 4.24 MIL/CMM (ref 3.8–5.2)
SODIUM SERPL-SCNC: 131 MEQ/L (ref 136–145)
WBC # BLD AUTO: 6.4 TH/CMM (ref 4.8–10.8)

## 2019-08-25 RX ADMIN — PANTOPRAZOLE SODIUM SCH MG: 40 TABLET, DELAYED RELEASE ORAL at 09:09

## 2019-08-25 RX ADMIN — LEVOTHYROXINE SODIUM SCH MG: 100 TABLET ORAL at 06:39

## 2019-08-25 RX ADMIN — NYSTATIN SCH APPL: 100000 CREAM TOPICAL at 09:08

## 2019-08-25 NOTE — INTERNAL MEDICINE PROG NOTE
Internal Medicine Subjective





- Subjective


Service Date: 08/25/19


Patient seen and examined:: with staff (SHE IS NOT EATING WELL)


Patient is:: verbal, in bed, confused


Per staff patient has:: no adverse event





Internal Medicine Objective





- Results


Result Diagrams: 


 08/25/19 17:19





 08/25/19 17:19


Recent Labs: 


 Laboratory Last Values











WBC  6.4 Th/cmm (4.8-10.8)   08/25/19  17:19    


 


RBC  4.24 Mil/cmm (3.80-5.20)   08/25/19  17:19    


 


Hgb  11.4 gm/dL (12-16)  L  08/25/19  17:19    


 


Hct  33.6 % (41.0-60)  L  08/25/19  17:19    


 


MCV  79.1 fl ()  L  08/25/19  17:19    


 


MCH  26.8 pg (27.0-31.0)  L  08/25/19  17:19    


 


MCHC Differential  33.9 pg (28.0-36.0)   08/25/19  17:19    


 


RDW  16.0 % (11.5-20.0)   08/25/19  17:19    


 


Plt Count  466 Th/cmm (150-400)  H  08/25/19  17:19    


 


MPV  5.9 fl  08/25/19  17:19    


 


Neutrophils %  78.4 % (40.0-80.0)   08/25/19  17:19    


 


Lymphocytes %  11.1 % (20.0-50.0)  L  08/25/19  17:19    


 


Monocytes %  9.0 % (2.0-10.0)   08/25/19  17:19    


 


Eosinophils %  1.1 % (0.0-5.0)   08/25/19  17:19    


 


Basophils %  0.4 % (0.0-2.0)   08/25/19  17:19    


 


Sodium  131 mEq/L (136-145)  L  08/25/19  17:19    


 


Potassium  3.7 mEq/L (3.5-5.1)   08/25/19  17:19    


 


Chloride  97 mEq/L ()  L  08/25/19  17:19    


 


Carbon Dioxide  25.4 mEq/L (21.0-31.0)   08/25/19  17:19    


 


Anion Gap  12.3  (7.0-16.0)   08/25/19  17:19    


 


BUN  7 mg/dL (7-25)   08/25/19  17:19    


 


Creatinine  0.5 mg/dL (0.6-1.2)  L  08/25/19  17:19    


 


Est GFR ( Amer)  TNP   08/25/19  17:19    


 


Est GFR (Non-Af Amer)  TNP   08/25/19  17:19    


 


BUN/Creatinine Ratio  14.0   08/25/19  17:19    


 


Glucose  204 mg/dL ()  H  08/25/19  17:19    


 


Calcium  8.4 mg/dL (8.6-10.3)  L  08/25/19  17:19    


 


Total Bilirubin  0.3 mg/dL (0.3-1.0)   08/25/19  17:19    


 


AST  8 U/L (13-39)  L  08/25/19  17:19    


 


ALT  4 U/L (7-52)  L  08/25/19  17:19    


 


Alkaline Phosphatase  77 U/L ()   08/25/19  17:19    


 


Total Protein  5.6 gm/dL (6.0-8.3)  L  08/25/19  17:19    


 


Albumin  2.9 gm/dL (3.7-5.3)  L  08/25/19  17:19    


 


Globulin  2.7 gm/dL  08/25/19  17:19    


 


Albumin/Globulin Ratio  1.1  (1.0-1.8)   08/25/19  17:19    


 


Triglycerides  145 mg/dL (<150)   08/23/19  06:48    


 


Cholesterol  145 mg/dL (<200)   08/23/19  06:48    


 


LDL Cholesterol Direct  103 mg/dL ()   08/23/19  06:48    


 


HDL Cholesterol  27 mg/dL (23-92)   08/23/19  06:48    














- Physical Exam


Vitals and I&O: 


 Vital Signs











Temp  99.2 F   08/25/19 14:11


 


Pulse  115   08/25/19 16:46


 


Resp  20   08/25/19 14:11


 


BP  103/55   08/25/19 16:46


 


Pulse Ox  96   08/25/19 14:11








 Intake & Output











 08/24/19 08/25/19 08/25/19





 18:59 06:59 18:59


 


Other:   


 


  # Voids 3 3 2


 


  # Bowel Movements 0  2











Active Medications: 


Current Medications





Acetaminophen (Tylenol)  650 mg PO Q4HR PRN


   PRN Reason: Mild Pain / Temp above 100


   Stop: 10/21/19 21:31


   Last Admin: 08/25/19 15:24 Dose:  650 mg


Al Hydrox/Mg Hydrox/Simethicone (Maalox)  30 ml PO Q4HR PRN


   PRN Reason: GI DISTRESS


   Stop: 10/21/19 21:31


Carvedilol (Coreg)  3.125 mg PO BID [facility]


   Stop: 10/21/19 22:14


   Last Admin: 08/25/19 16:46 Dose:  Not Given


Furosemide (Lasix)  20 mg PO DAILY [facility]


   Stop: 10/22/19 08:59


   Last Admin: 08/25/19 09:09 Dose:  20 mg


Haloperidol Lactate (Haldol)  2 mg IM Q6HR PRN


   PRN Reason: Agitation


   Stop: 10/21/19 21:40


Levothyroxine Sodium (Synthroid)  0.175 mg PO QDAC [facility]


   Stop: 10/22/19 07:29


   Last Admin: 08/25/19 06:39 Dose:  0.175 mg


Lorazepam (Ativan)  0.5 mg PO Q4HR PRN; Protocol


   PRN Reason: Anxiety


   Stop: 09/21/19 21:31


Magnesium Hydroxide (Milk Of Magnesia)  30 ml PO HS PRN


   PRN Reason: Constipation


Megestrol Acetate (Megace)  400 mg PO BID [facility]; Protocol


   Stop: 10/25/19 08:59


Mirtazapine (Remeron)  15 mg PO HS [facility]; Protocol


   Stop: 10/22/19 20:59


   Last Admin: 08/24/19 20:32 Dose:  15 mg


Multivitamins/Vitamin C (Theragran)  1 tab PO DAILY [facility]


   Stop: 10/22/19 08:59


   Last Admin: 08/25/19 09:09 Dose:  1 tab


Nystatin (Mycostatin Cream)  1 appl TP DAILY [facility]


   Stop: 09/23/19 08:59


   Last Admin: 08/25/19 09:08 Dose:  1 appl


Pantoprazole Sodium (Protonix)  40 mg PO DAILY [facility]


   Stop: 10/22/19 08:59


   Last Admin: 08/25/19 09:09 Dose:  40 mg


Pregabalin (Lyrica)  50 mg PO BID [facility]


   Stop: 10/22/19 08:59


   Last Admin: 08/25/19 16:57 Dose:  Not Given


Quetiapine Fumarate (Seroquel)  25 mg PO HS [facility]; Protocol


   Stop: 10/22/19 20:59


   Last Admin: 08/24/19 20:32 Dose:  25 mg


Tramadol HCl (Ultram)  50 mg PO Q6HR PRN


   PRN Reason: Pain (Severe)


   Stop: 10/22/19 18:41


   Last Admin: 08/25/19 09:09 Dose:  50 mg


Trazodone HCl (Desyrel)  50 mg PO HS [facility]; Protocol


   Stop: 10/22/19 20:59


   Last Admin: 08/24/19 20:32 Dose:  50 mg


Zolpidem Tartrate (Ambien)  5 mg PO HS PRN


   PRN Reason: Insomnia


   Stop: 10/21/19 21:31








General: alert


HEENT: NC/AT, PERRLA, EOMI, anicteric sclerae, throat clear


Neck: Supple, No JVD, No thyromegaly, +2 carotid pulse wo bruit, No LAD


Cardiovascular: RRR, Normal S1, Normal S2, without murmur


Abdomen: soft, non-tender, non-distended


Extremities: clear


Neurological: no change





Internal Medicine Assmt/Plan





- Assessment


Assessment: 





1.HYPOTHYROIDISM.


2.DJD.


3.MALLNUTRITION.


4.PSYCHOSIS





- Plan


Plan: 





CONTINUE ON CURRENT MEDICATION AND DIETMEGACE 10 CC PO BID

## 2019-08-26 RX ADMIN — LEVOTHYROXINE SODIUM SCH MG: 100 TABLET ORAL at 06:50

## 2019-08-26 RX ADMIN — NYSTATIN SCH APPL: 100000 CREAM TOPICAL at 08:17

## 2019-08-26 RX ADMIN — PANTOPRAZOLE SODIUM SCH MG: 40 TABLET, DELAYED RELEASE ORAL at 08:15

## 2019-08-26 NOTE — PROGRESS NOTES
DATE:  08/26/2019



SUBJECTIVE:  Case was discussed with staff of the patient, reviewed records. 

Covering for Dr. Patterson.  This is an 84-year-old female, who was admitted on

08/22/2019.  She was paranoid, believes she was kidnapped.  She was seen at [facility] because of agitation and irritability.  The patient was

calling 911, tell them that she was kidnapped while she was in the hospital. 

She was also confused, easily agitated, irritable, was unable to provide any

safe plan for self-care.  She said that she has 2 daughters, live in other state

and that they cannot be contacted, while  in the hospital said that

her daughter lives close by and she was involved within her treatment.  The

patient is also getting more agitated when in the hospital about her current

living situation that she need to be in safe environment with a history of what

seemed to be like dementia and psychosis.  The patient has been on Remeron and

Seroquel.  The patient continues to be easily agitated, at times selectively

mute, is engaged.  She continues to need redirection.  She continues to have

impulsive and unpredictable behavior.  She is being compliant with the

medication with no side effects, no sedation, no nausea, and no extrapyramidal

symptoms and she is on Remeron 15 mg at bedtime, multivitamin, and Seroquel 25

mg at bedtime with no side effects, no sedation, and no nausea.  We will

continue to work with the patient in group therapy, milieu therapy, and adjust

the medications as needed.





DD: 08/26/2019 13:28

DT: 08/26/2019 23:26

JOB# [id number]  [id number]

## 2019-08-26 NOTE — PROGRESS NOTES
DATE:  08/25/2019



Covering for Dr. Patterson.



SUBJECTIVE:  The patient was seen and evaluated.  The patient's chart reviewed.



Today on face-to-face evaluation, the patient is ____ selectively mute,

disengaged in her conversation, needing a lot of redirection to keep a simple

conversation and impulsive.



MENTAL STATUS EXAMINATION:  Impulsive, tangential with flight of ideas.



ASSESSMENT AND PLAN:  History of dementia with behavior disturbances due to the

ongoing disorganized thought process and poor ability to use her resources and

____ cognitive impairment, unable to formulate safe plan outside of structured

environment.  We will continue with primary psychiatrist's treatment plan and

goals.





DD: 08/25/2019 06:55

DT: 08/25/2019 19:47

JOB# [id number]  [id number]

## 2019-08-26 NOTE — INTERNAL MEDICINE PROG NOTE
Internal Medicine Subjective





- Subjective


Service Date: 19


Patient seen and examined:: with staff (SHE IS DOING BETTER)


Patient is:: verbal, in bed, confused


Per staff patient has:: no adverse event





Internal Medicine Objective





- Results


Result Diagrams: 


 19 17:19





 19 17:19


Recent Labs: 


 Laboratory Last Values











WBC  6.4 Th/cmm (4.8-10.8)   19  17:19    


 


RBC  4.24 Mil/cmm (3.80-5.20)   19  17:19    


 


Hgb  11.4 gm/dL (12-16)  L  19  17:19    


 


Hct  33.6 % (41.0-60)  L  19  17:19    


 


MCV  79.1 fl ()  L  19  17:19    


 


MCH  26.8 pg (27.0-31.0)  L  19  17:19    


 


MCHC Differential  33.9 pg (28.0-36.0)   19  17:19    


 


RDW  16.0 % (11.5-20.0)   19  17:19    


 


Plt Count  466 Th/cmm (150-400)  H  19  17:19    


 


MPV  5.9 fl  19  17:19    


 


Neutrophils %  78.4 % (40.0-80.0)   19  17:19    


 


Lymphocytes %  11.1 % (20.0-50.0)  L  19  17:19    


 


Monocytes %  9.0 % (2.0-10.0)   19  17:19    


 


Eosinophils %  1.1 % (0.0-5.0)   19  17:19    


 


Basophils %  0.4 % (0.0-2.0)   19  17:19    


 


Sodium  131 mEq/L (136-145)  L  19  17:19    


 


Potassium  3.7 mEq/L (3.5-5.1)   19  17:19    


 


Chloride  97 mEq/L ()  L  19  17:19    


 


Carbon Dioxide  25.4 mEq/L (21.0-31.0)   19  17:19    


 


Anion Gap  12.3  (7.0-16.0)   19  17:19    


 


BUN  7 mg/dL (7-25)   19  17:19    


 


Creatinine  0.5 mg/dL (0.6-1.2)  L  19  17:19    


 


Est GFR ( Amer)  TNP   19  17:19    


 


Est GFR (Non-Af Amer)  TNP   19  17:19    


 


BUN/Creatinine Ratio  14.0   19  17:19    


 


Glucose  204 mg/dL ()  H  19  17:19    


 


Calcium  8.4 mg/dL (8.6-10.3)  L  19  17:19    


 


Total Bilirubin  0.3 mg/dL (0.3-1.0)   19  17:19    


 


AST  8 U/L (13-39)  L  19  17:19    


 


ALT  4 U/L (7-52)  L  19  17:19    


 


Alkaline Phosphatase  77 U/L ()   19  17:19    


 


Total Protein  5.6 gm/dL (6.0-8.3)  L  19  17:19    


 


Albumin  2.9 gm/dL (3.7-5.3)  L  19  17:19    


 


Globulin  2.7 gm/dL  19  17:19    


 


Albumin/Globulin Ratio  1.1  (1.0-1.8)   19  17:19    


 


Triglycerides  145 mg/dL (<150)   19  06:48    


 


Cholesterol  145 mg/dL (<200)   19  06:48    


 


LDL Cholesterol Direct  103 mg/dL ()   19  06:48    


 


HDL Cholesterol  27 mg/dL (23-92)   19  06:48    














- Physical Exam


Vitals and I&O: 


 Vital Signs











Temp  98.5 F   19 14:22


 


Pulse  96   19 16:41


 


Resp  19   19 20:00


 


BP  133/82   19 16:41


 


Pulse Ox  97   19 14:22








 Intake & Output











 19





 06:59 18:59 06:59


 


Intake Total 120 900 


 


Balance 120 900 


 


Intake:   


 


  Oral 120 900 


 


Other:   


 


  # Voids 3 4 


 


  # Bowel Movements  1 











Active Medications: 


Current Medications





Acetaminophen (Tylenol)  650 mg PO Q4HR PRN


   PRN Reason: Mild Pain / Temp above 100


   Stop: 10/21/19 21:31


   Last Admin: 19 15:24 Dose:  650 mg


Al Hydrox/Mg Hydrox/Simethicone (Maalox)  30 ml PO Q4HR PRN


   PRN Reason: GI DISTRESS


   Stop: 10/21/19 21:31


Carvedilol (Coreg)  3.125 mg PO BID ORLY


   Stop: 10/21/19 22:14


   Last Admin: 19 16:41 Dose:  3.125 mg


Furosemide (Lasix)  20 mg PO DAILY ORLY


   Stop: 10/22/19 08:59


   Last Admin: 19 08:16 Dose:  Not Given


Haloperidol Lactate (Haldol)  2 mg IM Q6HR PRN


   PRN Reason: Agitation


   Stop: 10/21/19 21:40


Levothyroxine Sodium (Synthroid)  0.175 mg PO QDAC ORLY


   Stop: 10/22/19 07:29


   Last Admin: 19 06:50 Dose:  0.175 mg


Lorazepam (Ativan)  0.5 mg PO Q4HR PRN; Protocol


   PRN Reason: Anxiety


   Stop: 19 21:31


Magnesium Hydroxide (Milk Of Magnesia)  30 ml PO HS PRN


   PRN Reason: Constipation


Megestrol Acetate (Megace)  400 mg PO BID ORLY; Protocol


   Stop: 10/25/19 08:59


   Last Admin: 19 16:40 Dose:  400 mg


Mirtazapine (Remeron)  15 mg PO HS ORLY; Protocol


   Stop: 10/22/19 20:59


   Last Admin: 19 21:50 Dose:  15 mg


Multivitamins/Vitamin C (Theragran)  1 tab PO DAILY ORLY


   Stop: 10/22/19 08:59


   Last Admin: 19 08:17 Dose:  1 tab


Nystatin (Mycostatin Cream)  1 appl TP DAILY ORLY


   Stop: 19 08:59


   Last Admin: 19 08:17 Dose:  1 appl


Nystatin (Nystop)  100 units TP DAILY PRN


   PRN Reason: redness to breast/abdom. folds


   Stop: 10/25/19 16:57


Pantoprazole Sodium (Protonix)  40 mg PO DAILY ORLY


   Stop: 10/22/19 08:59


   Last Admin: 19 08:15 Dose:  40 mg


Pregabalin (Lyrica)  50 mg PO BID ORLY


   Stop: 10/22/19 08:59


   Last Admin: 19 16:41 Dose:  50 mg


Quetiapine Fumarate (Seroquel)  25 mg PO HS ORLY; Protocol


   Stop: 10/22/19 20:59


   Last Admin: 19 21:50 Dose:  25 mg


Tramadol HCl (Ultram)  50 mg PO Q6HR PRN


   PRN Reason: Pain (Severe)


   Stop: 10/22/19 18:41


   Last Admin: 19 16:42 Dose:  50 mg


Trazodone HCl (Desyrel)  50 mg PO HS ORLY; Protocol


   Stop: 10/22/19 20:59


   Last Admin: 19 21:50 Dose:  50 mg


Zolpidem Tartrate (Ambien)  5 mg PO HS PRN


   PRN Reason: Insomnia


   Stop: 10/21/19 21:31








General: alert


HEENT: NC/AT, PERRLA, EOMI, anicteric sclerae, throat clear


Neck: Supple, No JVD, No thyromegaly, +2 carotid pulse wo bruit, No LAD


Cardiovascular: RRR, Normal S1, Normal S2, without murmur


Abdomen: soft, non-tender, non-distended


Extremities: clear


Neurological: no change





Internal Medicine Assmt/Plan





- Assessment


Assessment: 





1.HYPOTHYROIDISM.


2.DJD.


3.MALLNUTRITION.


4.PSYCHOSIS





- Plan


Plan: 





CONTINUE ON CURRENT MEDICATION AND DIET.





Nutritional Asmnt/Malnutr-PDOC





- Dietary Evaluation


Malnutrition Findings (Please click <Entered> for more info): 








Nutritional Asmnt/Malnutrition                             Start:  19 11:

46


Text:                                                      Status: Complete    

  


Freq:                                                                          

  


Protocol:                                                                      

  


 Document     19 11:46  NICOLA  (Rec: 19 11:55  NICOLA [id number])


 Nutritional Asmnt/Malnutrition


     Patient General Information


      Nutritional Screening                      Moderate Risk


                                                 Consult


      Diagnosis                                  Psychosis NOS


      Pertinent Medical Hx/Surgical Hx           HTN, Diverticulosis, DJD,


                                                 hypothyroidism, GERD, Dementia


      Subjective Information                     Consult: low lab results


                                                 Pt is a 84-year-old female


                                                 admitted on  d/t


                                                 mechanical fall with ALOC. Pt


                                                 is eating 72% of meals x3 days


                                                 Per Meal/Nutrition Activity


                                                 Record. Pt currently receives


                                                 Ensure TID, d/t good PO intake


                                                 , the extra kcals and Pro are


                                                 unnecessary. Visited Pt at


                                                 bedside after breakfast time,


                                                 she was fast asleep- RN stated


                                                 she did not sleep well last


                                                 night. Recommended to RN,


                                                 Eleanor, to speak with MD


                                                 pertaining to the supplement


                                                 order.


                                                 HT: 5 FT


                                                 WT:140 LB (63.64 kg)


                                                 BMI: 27.37 (Overweight)


                                                 GI: Soft, Non-tender


                                                 BM: 8/26 x2


                                                 I/O: 120/Not Noted


                                                 Skin: Rash, burning, itching,


                                                 dryness, redness, flaking,


                                                 bruises. Excoriation in


                                                 buttocks with dryness and


                                                 peeling


                                                 Chao: 14


                                                 Diet Order: Cardiac, Chopped,


                                                 Ensure Enlive TID


                                                 Estimated Energy Needs: (


                                                 Geriatric, CBW)


                                                 2978-3163 kcals (25-30 kcals/


                                                 kg)


                                                 64-76g Pro (1.0-1.2 g/kg)


                                                 0392-6830 ml (25-30 ml/kg)


      Current Diet Order/ Nutrition Support      Cardiac, Chopped, Ensure


                                                 Enlive TID


      Pertinent Medications                      Maalox (PRN), Coreg, Lasix,


                                                 Synthroid, MOM (PRN), Megace,


                                                 Theragran, Protonix


      Pertinent Labs                             : Hgb/Hct 11.4/33.6, Na


                                                 131, BUN/Cr 7/0.5, Glucose 204


                                                 , Ca 8.4, AST 8, ALT 4, T Pro


                                                 5.6, Alb 2.9


     Nutritional Hx/Data


      Height                                     12.7 cm


      Height (Calculated Centimeters)            12.7


      Current Weight (lbs)                       63.503 kg


      Weight (Calculated Kilograms)              63.5


      Weight (Calculated Grams)                  71954.9


      Ideal Body Weight                          100 LB (45.45 kg)


      % Ideal Body Weight                        140


      Body Mass Index (BMI)                      3936.8


      Weight Status                              Overweight


     GI Symptoms


      GI Symptoms                                None


      Last BM                                    8/26 x2


      Skin Integrity/Comment:                    Skin: Rash, burning, itching,


                                                 dryness, redness, flaking,


                                                 bruises. Excoriation in


                                                 buttocks with dryness and


                                                 peeling


                                                 Chao: 14


      Current %PO                                Fair (50-74%)


     Estimated Nutritional Goals


      BEE in Kcals:                              Using Current wt


      Calories/Kcals/Kg                          3699-8672


      Kcals Calculated                           25-30


      Protein:                                   Using Current wt


      Protein g/k.0-1.2


      Protein Calculated                         64-76


      Fluid: ml                                  9885-0925 ml (25-30 ml/kg)


     Nutritional Problem


      1. Problem


       Problem                                   Altered nutrition related labs


       Etiology                                  r/t pathophysiological causes


       Signs/Symptoms:                           aeb : Hgb/Hct 11.4/33.6, Na


                                                 131, BUN/Cr 7/0.5, Glucose 204


                                                 , Ca 8.4, AST 8, ALT 4, T Pro


                                                 5.6, Alb 2.9


     Malnutrition Related to Morbid Obesity


      Malnutrition related to morbid obesity     No


     Intervention/Recommendation


      Comments                                   1. Continue with Cardiac,


                                                 Chopped, Ensure Enlive TID


                                                 diet as ordered.


                                                 2. Recommend d/c the Ensure


                                                 Enlive TID as PT PO intake is


                                                 adequate to estimated meet


                                                 nutritional needs.


     Expected Outcomes/Goals


      Expected Outcomes/Goals                    1. PO intake to meet 75% of


                                                 nutritional needs.


                                                 2. Monitor PO intake, wt,


                                                 nutrition related labs, and


                                                 skin integrity to trend WNL.


                                                 3. F/U as moderate risk in 3-5


                                                 days, -

## 2019-08-27 RX ADMIN — PANTOPRAZOLE SODIUM SCH MG: 40 TABLET, DELAYED RELEASE ORAL at 08:31

## 2019-08-27 RX ADMIN — NYSTATIN SCH APPL: 100000 CREAM TOPICAL at 08:25

## 2019-08-27 RX ADMIN — LEVOTHYROXINE SODIUM SCH MG: 100 TABLET ORAL at 06:46

## 2019-08-27 NOTE — INTERNAL MEDICINE PROG NOTE
Internal Medicine Subjective





- Subjective


Service Date: 19


Patient seen and examined:: with staff (she feels better)


Patient is:: verbal, in bed, confused


Per staff patient has:: no adverse event





Internal Medicine Objective





- Results


Result Diagrams: 


 19 17:19





 19 17:19


Recent Labs: 


 Laboratory Last Values











WBC  6.4 Th/cmm (4.8-10.8)   19  17:19    


 


RBC  4.24 Mil/cmm (3.80-5.20)   19  17:19    


 


Hgb  11.4 gm/dL (12-16)  L  19  17:19    


 


Hct  33.6 % (41.0-60)  L  19  17:19    


 


MCV  79.1 fl ()  L  19  17:19    


 


MCH  26.8 pg (27.0-31.0)  L  19  17:19    


 


MCHC Differential  33.9 pg (28.0-36.0)   19  17:19    


 


RDW  16.0 % (11.5-20.0)   19  17:19    


 


Plt Count  466 Th/cmm (150-400)  H  19  17:19    


 


MPV  5.9 fl  19  17:19    


 


Neutrophils %  78.4 % (40.0-80.0)   19  17:19    


 


Lymphocytes %  11.1 % (20.0-50.0)  L  19  17:19    


 


Monocytes %  9.0 % (2.0-10.0)   19  17:19    


 


Eosinophils %  1.1 % (0.0-5.0)   19  17:19    


 


Basophils %  0.4 % (0.0-2.0)   19  17:19    


 


Sodium  131 mEq/L (136-145)  L  19  17:19    


 


Potassium  3.7 mEq/L (3.5-5.1)   19  17:19    


 


Chloride  97 mEq/L ()  L  19  17:19    


 


Carbon Dioxide  25.4 mEq/L (21.0-31.0)   19  17:19    


 


Anion Gap  12.3  (7.0-16.0)   19  17:19    


 


BUN  7 mg/dL (7-25)   19  17:19    


 


Creatinine  0.5 mg/dL (0.6-1.2)  L  19  17:19    


 


Est GFR ( Amer)  TNP   19  17:19    


 


Est GFR (Non-Af Amer)  TNP   19  17:19    


 


BUN/Creatinine Ratio  14.0   19  17:19    


 


Glucose  204 mg/dL ()  H  19  17:19    


 


Calcium  8.4 mg/dL (8.6-10.3)  L  19  17:19    


 


Total Bilirubin  0.3 mg/dL (0.3-1.0)   19  17:19    


 


AST  8 U/L (13-39)  L  19  17:19    


 


ALT  4 U/L (7-52)  L  19  17:19    


 


Alkaline Phosphatase  77 U/L ()   19  17:19    


 


Total Protein  5.6 gm/dL (6.0-8.3)  L  19  17:19    


 


Albumin  2.9 gm/dL (3.7-5.3)  L  19  17:19    


 


Globulin  2.7 gm/dL  19  17:19    


 


Albumin/Globulin Ratio  1.1  (1.0-1.8)   19  17:19    


 


Triglycerides  145 mg/dL (<150)   19  06:48    


 


Cholesterol  145 mg/dL (<200)   19  06:48    


 


LDL Cholesterol Direct  103 mg/dL ()   19  06:48    


 


HDL Cholesterol  27 mg/dL (23-92)   19  06:48    














- Physical Exam


Vitals and I&O: 


 Vital Signs











Temp  98 F   19 20:19


 


Pulse  98   19 20:19


 


Resp  19   19 20:19


 


BP  101/58   19 20:19


 


Pulse Ox  96   19 20:19








 Intake & Output











 19





 06:59 18:59 06:59


 


Intake Total 240 900 120


 


Output Total 1  1


 


Balance 239 900 119


 


Intake:   


 


  Oral 240 900 120


 


Output:   


 


  Urine/Stool Mix 1  1


 


Other:   


 


  # Voids 3 3 1


 


  # Bowel Movements 0 1 











Active Medications: 


Current Medications





Acetaminophen (Tylenol)  650 mg PO Q4HR PRN


   PRN Reason: Mild Pain / Temp above 100


   Stop: 10/21/19 21:31


   Last Admin: 19 15:24 Dose:  650 mg


Al Hydrox/Mg Hydrox/Simethicone (Maalox)  30 ml PO Q4HR PRN


   PRN Reason: GI DISTRESS


   Stop: 10/21/19 21:31


Carvedilol (Coreg)  3.125 mg PO BID ORLY


   Stop: 10/21/19 22:14


   Last Admin: 19 16:45 Dose:  Not Given


Furosemide (Lasix)  20 mg PO DAILY ORLY


   Stop: 10/22/19 08:59


   Last Admin: 19 08:33 Dose:  20 mg


Haloperidol Lactate (Haldol)  2 mg IM Q6HR PRN


   PRN Reason: Agitation


   Stop: 10/21/19 21:40


Levothyroxine Sodium (Synthroid)  0.175 mg PO QDAC ORLY


   Stop: 10/22/19 07:29


   Last Admin: 19 06:46 Dose:  0.175 mg


Lorazepam (Ativan)  0.5 mg PO Q4HR PRN; Protocol


   PRN Reason: Anxiety


   Stop: 19 21:31


Magnesium Hydroxide (Milk Of Magnesia)  30 ml PO HS PRN


   PRN Reason: Constipation


Megestrol Acetate (Megace)  400 mg PO BID [facility]; Protocol


   Stop: 10/25/19 08:59


   Last Admin: 19 16:44 Dose:  400 mg


Mirtazapine (Remeron)  15 mg PO HS ORLY; Protocol


   Stop: 10/22/19 20:59


   Last Admin: 19 21:12 Dose:  15 mg


Multivitamins/Vitamin C (Theragran)  1 tab PO DAILY ORLY


   Stop: 10/22/19 08:59


   Last Admin: 19 08:33 Dose:  1 tab


Nystatin (Mycostatin Cream)  1 appl TP DAILY ORLY


   Stop: 19 08:59


   Last Admin: 19 08:25 Dose:  1 appl


Nystatin (Nystop)  100 units TP DAILY PRN


   PRN Reason: redness to breast/abdom. folds


   Stop: 10/25/19 16:57


Pantoprazole Sodium (Protonix)  40 mg PO DAILY ORLY


   Stop: 10/22/19 08:59


   Last Admin: 19 08:31 Dose:  40 mg


Pregabalin (Lyrica)  50 mg PO BID ORLY


   Stop: 10/22/19 08:59


   Last Admin: 19 16:44 Dose:  50 mg


Quetiapine Fumarate (Seroquel)  25 mg PO HS ORLY; Protocol


   Stop: 10/22/19 20:59


   Last Admin: 19 21:13 Dose:  25 mg


Tramadol HCl (Ultram)  50 mg PO Q6HR PRN


   PRN Reason: Pain (Severe)


   Stop: 10/22/19 18:41


   Last Admin: 19 16:42 Dose:  50 mg


Trazodone HCl (Desyrel)  50 mg PO HS ORLY; Protocol


   Stop: 10/22/19 20:59


   Last Admin: 19 21:12 Dose:  50 mg


Zolpidem Tartrate (Ambien)  5 mg PO HS PRN


   PRN Reason: Insomnia


   Stop: 10/21/19 21:31








General: alert


HEENT: NC/AT, PERRLA, EOMI, anicteric sclerae, throat clear


Neck: Supple, No JVD, No thyromegaly, +2 carotid pulse wo bruit, No LAD


Cardiovascular: RRR, Normal S1, Normal S2, without murmur


Abdomen: soft, non-tender, non-distended


Extremities: clear


Neurological: no change





Internal Medicine Assmt/Plan





- Assessment


Assessment: 





1.HYPOTHYROIDISM.


2.DJD.


3.MALLNUTRITION.


4.PSYCHOSIS





- Plan


Plan: 





CONTINUE ON CURRENT MEDICATION AND DIET.





Nutritional Asmnt/Malnutr-PDOC





- Dietary Evaluation


Malnutrition Findings (Please click <Entered> for more info): 








Nutritional Asmnt/Malnutrition                             Start:  19 11:

46


Text:                                                      Status: Complete    

  


Freq:                                                                          

  


Protocol:                                                                      

  


 Document     19 11:46  NICOLA  (Rec: 19 11:55  NICOLA [id number])


 Nutritional Asmnt/Malnutrition


     Patient General Information


      Nutritional Screening                      Moderate Risk


                                                 Consult


      Diagnosis                                  Psychosis NOS


      Pertinent Medical Hx/Surgical Hx           HTN, Diverticulosis, DJD,


                                                 hypothyroidism, GERD, Dementia


      Subjective Information                     Consult: low lab results


                                                 Pt is a 84-year-old female


                                                 admitted on  d/t


                                                 mechanical fall with ALOC. Pt


                                                 is eating 72% of meals x3 days


                                                 Per Meal/Nutrition Activity


                                                 Record. Pt currently receives


                                                 Ensure TID, d/t good PO intake


                                                 , the extra kcals and Pro are


                                                 unnecessary. Visited Pt at


                                                 bedside after breakfast time,


                                                 she was fast asleep- RN stated


                                                 she did not sleep well last


                                                 night. Recommended to RN,


                                                 Eleanor, to speak with MD


                                                 pertaining to the supplement


                                                 order.


                                                 HT: 5 FT


                                                 WT:140 LB (63.64 kg)


                                                 BMI: 27.37 (Overweight)


                                                 GI: Soft, Non-tender


                                                 BM: 8/26 x2


                                                 I/O: 120/Not Noted


                                                 Skin: Rash, burning, itching,


                                                 dryness, redness, flaking,


                                                 bruises. Excoriation in


                                                 buttocks with dryness and


                                                 peeling


                                                 Chao: 14


                                                 Diet Order: Cardiac, Chopped,


                                                 Ensure Enlive TID


                                                 Estimated Energy Needs: (


                                                 Geriatric, CBW)


                                                 9414-5670 kcals (25-30 kcals/


                                                 kg)


                                                 64-76g Pro (1.0-1.2 g/kg)


                                                 9960-6878 ml (25-30 ml/kg)


      Current Diet Order/ Nutrition Support      Cardiac, Chopped, Ensure


                                                 Enlive TID


      Pertinent Medications                      Maalox (PRN), Coreg, Lasix,


                                                 Synthroid, MOM (PRN), Megace,


                                                 Theragran, Protonix


      Pertinent Labs                             : Hgb/Hct 11.4/33.6, Na


                                                 131, BUN/Cr 7/0.5, Glucose 204


                                                 , Ca 8.4, AST 8, ALT 4, T Pro


                                                 5.6, Alb 2.9


     Nutritional Hx/Data


      Height                                     12.7 cm


      Height (Calculated Centimeters)            12.7


      Current Weight (lbs)                       63.503 kg


      Weight (Calculated Kilograms)              63.5


      Weight (Calculated Grams)                  04242.9


      Ideal Body Weight                          100 LB (45.45 kg)


      % Ideal Body Weight                        140


      Body Mass Index (BMI)                      3936.8


      Weight Status                              Overweight


     GI Symptoms


      GI Symptoms                                None


      Last BM                                    8/26 x2


      Skin Integrity/Comment:                    Skin: Rash, burning, itching,


                                                 dryness, redness, flaking,


                                                 bruises. Excoriation in


                                                 buttocks with dryness and


                                                 peeling


                                                 Chao: 14


      Current %PO                                Fair (50-74%)


     Estimated Nutritional Goals


      BEE in Kcals:                              Using Current wt


      Calories/Kcals/Kg                          0802-4678


      Kcals Calculated                           25-30


      Protein:                                   Using Current wt


      Protein g/k.0-1.2


      Protein Calculated                         64-76


      Fluid: ml                                  1317-5783 ml (25-30 ml/kg)


     Nutritional Problem


      1. Problem


       Problem                                   Altered nutrition related labs


       Etiology                                  r/t pathophysiological causes


       Signs/Symptoms:                           aeb : Hgb/Hct 11.4/33.6, Na


                                                 131, BUN/Cr 7/0.5, Glucose 204


                                                 , Ca 8.4, AST 8, ALT 4, T Pro


                                                 5.6, Alb 2.9


     Malnutrition Related to Morbid Obesity


      Malnutrition related to morbid obesity     No


     Intervention/Recommendation


      Comments                                   1. Continue with Cardiac,


                                                 Chopped, Ensure Enlive TID


                                                 diet as ordered.


                                                 2. Recommend d/c the Ensure


                                                 Enlive TID as PT PO intake is


                                                 adequate to estimated meet


                                                 nutritional needs.


     Expected Outcomes/Goals


      Expected Outcomes/Goals                    1. PO intake to meet 75% of


                                                 nutritional needs.


                                                 2. Monitor PO intake, wt,


                                                 nutrition related labs, and


                                                 skin integrity to trend WNL.


                                                 3. F/U as moderate risk in 3-5


                                                 days, -

## 2019-08-27 NOTE — PROGRESS NOTES
DATE:  08/27/2019



Case was discussed with staff of the patient, reviewed records.  The patient

continues to be unpredictable, impulsive.  She continues to have herself feels

she is paranoid, continues to have poor insight.  Continues to have delusional

believes, she is unpredictable, impulsive, needing redirection.  She is sleeping

better, eating better.  No side effects of the medication, no sedation, no

nausea, no extrapyramidal symptoms.  We will continue to work with the patient

in group therapy, milieu therapy, and adjust the medication as needed.





DD: 08/27/2019 11:16

DT: 08/27/2019 14:24

JOB# [id number]  [id number]

## 2019-08-28 RX ADMIN — NYSTATIN SCH APPL: 100000 CREAM TOPICAL at 09:00

## 2019-08-28 RX ADMIN — PANTOPRAZOLE SODIUM SCH MG: 40 TABLET, DELAYED RELEASE ORAL at 09:24

## 2019-08-28 RX ADMIN — LEVOTHYROXINE SODIUM SCH MG: 100 TABLET ORAL at 07:30

## 2019-08-28 NOTE — PROGRESS NOTES
DATE:  



SUBJECTIVE:  Chart reviewed and the patient interviewed.  Also discussed the

patient's condition with the staff and reviewed records and labs.  The patient

is still suspicious and paranoid.  The patient also is still impulsive and has

unpredictable behavior.  She also still needs lots of redirections and patient

still has difficulty following staff directions.  Otherwise, the patient is

compliant with taking medications with no side effects of medications.



ASSESSMENT:  The patient is still paranoid and is still psychotic and needs

close monitoring.



TREATMENT PLAN:  Continue to monitor behavior and condition closely.  Also,

continue current medications of Seroquel 25 mg in the morning and trazodone 50

mg at bedtime.  Also, continue working on placement issue.





DD: 08/28/2019 07:46

DT: 08/28/2019 12:54

JOB# [id number]  [id number]

## 2019-08-28 NOTE — INTERNAL MEDICINE PROG NOTE
Internal Medicine Subjective





- Subjective


Service Date: 19


Patient seen and examined:: with staff (SHE FEELS BETTER)


Patient is:: verbal, in bed, confused


Per staff patient has:: no adverse event





Internal Medicine Objective





- Results


Result Diagrams: 


 19 17:19





 19 17:19


Recent Labs: 


 Laboratory Last Values











WBC  6.4 Th/cmm (4.8-10.8)   19  17:19    


 


RBC  4.24 Mil/cmm (3.80-5.20)   19  17:19    


 


Hgb  11.4 gm/dL (12-16)  L  19  17:19    


 


Hct  33.6 % (41.0-60)  L  19  17:19    


 


MCV  79.1 fl ()  L  19  17:19    


 


MCH  26.8 pg (27.0-31.0)  L  19  17:19    


 


MCHC Differential  33.9 pg (28.0-36.0)   19  17:19    


 


RDW  16.0 % (11.5-20.0)   19  17:19    


 


Plt Count  466 Th/cmm (150-400)  H  19  17:19    


 


MPV  5.9 fl  19  17:19    


 


Neutrophils %  78.4 % (40.0-80.0)   19  17:19    


 


Lymphocytes %  11.1 % (20.0-50.0)  L  19  17:19    


 


Monocytes %  9.0 % (2.0-10.0)   19  17:19    


 


Eosinophils %  1.1 % (0.0-5.0)   19  17:19    


 


Basophils %  0.4 % (0.0-2.0)   19  17:19    


 


Sodium  131 mEq/L (136-145)  L  19  17:19    


 


Potassium  3.7 mEq/L (3.5-5.1)   19  17:19    


 


Chloride  97 mEq/L ()  L  19  17:19    


 


Carbon Dioxide  25.4 mEq/L (21.0-31.0)   19  17:19    


 


Anion Gap  12.3  (7.0-16.0)   19  17:19    


 


BUN  7 mg/dL (7-25)   19  17:19    


 


Creatinine  0.5 mg/dL (0.6-1.2)  L  19  17:19    


 


Est GFR ( Amer)  TNP   19  17:19    


 


Est GFR (Non-Af Amer)  TNP   19  17:19    


 


BUN/Creatinine Ratio  14.0   19  17:19    


 


Glucose  204 mg/dL ()  H  19  17:19    


 


Calcium  8.4 mg/dL (8.6-10.3)  L  19  17:19    


 


Total Bilirubin  0.3 mg/dL (0.3-1.0)   19  17:19    


 


AST  8 U/L (13-39)  L  19  17:19    


 


ALT  4 U/L (7-52)  L  19  17:19    


 


Alkaline Phosphatase  77 U/L ()   19  17:19    


 


Total Protein  5.6 gm/dL (6.0-8.3)  L  19  17:19    


 


Albumin  2.9 gm/dL (3.7-5.3)  L  19  17:19    


 


Globulin  2.7 gm/dL  19  17:19    


 


Albumin/Globulin Ratio  1.1  (1.0-1.8)   19  17:19    


 


Triglycerides  145 mg/dL (<150)   19  06:48    


 


Cholesterol  145 mg/dL (<200)   19  06:48    


 


LDL Cholesterol Direct  103 mg/dL ()   19  06:48    


 


HDL Cholesterol  27 mg/dL (23-92)   19  06:48    














- Physical Exam


Vitals and I&O: 


 Vital Signs











Temp  100.1 F   19 20:53


 


Pulse  117   19 20:53


 


Resp  20   19 20:53


 


BP  114/61   19 20:53


 


Pulse Ox  97   19 20:53








 Intake & Output











 19





 06:59 18:59 06:59


 


Intake Total 120 800 120


 


Output Total 1  


 


Balance 119 800 120


 


Intake:   


 


  Oral 120 800 120


 


Output:   


 


  Urine/Stool Mix 1  


 


Other:   


 


  # Voids 3 4 2


 


  # Bowel Movements 0 2 0











Active Medications: 


Current Medications





Acetaminophen (Tylenol)  650 mg PO Q4HR PRN


   PRN Reason: Mild Pain / Temp above 100


   Stop: 10/21/19 21:31


   Last Admin: 19 20:38 Dose:  650 mg


Al Hydrox/Mg Hydrox/Simethicone (Maalox)  30 ml PO Q4HR PRN


   PRN Reason: GI DISTRESS


   Stop: 10/21/19 21:31


Carvedilol (Coreg)  3.125 mg PO BID ORLY


   Stop: 10/21/19 22:14


   Last Admin: 19 18:12 Dose:  Not Given


Furosemide (Lasix)  20 mg PO DAILY ORLY


   Stop: 10/22/19 08:59


   Last Admin: 19 09:24 Dose:  20 mg


Haloperidol Lactate (Haldol)  2 mg IM Q6HR PRN


   PRN Reason: Agitation


   Stop: 10/21/19 21:40


Levothyroxine Sodium 0.1 mg/ (Levothyroxine Sodium 0.075 mg)  0.175 mg PO QDAC 

ORLY


   Stop: 10/28/19 07:29


Lorazepam (Ativan)  0.5 mg PO Q4HR PRN; Protocol


   PRN Reason: Anxiety


   Stop: 19 21:31


Magnesium Hydroxide (Milk Of Magnesia)  30 ml PO HS PRN


   PRN Reason: Constipation


Megestrol Acetate (Megace)  400 mg PO BID ORLY; Protocol


   Stop: 10/25/19 08:59


   Last Admin: 19 18:13 Dose:  400 mg


Mirtazapine (Remeron)  15 mg PO HS ORLY; Protocol


   Stop: 10/22/19 20:59


   Last Admin: 19 20:37 Dose:  15 mg


Multivitamins/Vitamin C (Theragran)  1 tab PO DAILY ORLY


   Stop: 10/22/19 08:59


   Last Admin: 19 09:24 Dose:  1 tab


Nystatin (Mycostatin Cream)  1 appl TP DAILY ORLY


   Stop: 19 08:59


   Last Admin: 19 09:00 Dose:  1 appl


Nystatin (Nystop)  100 units TP DAILY PRN


   PRN Reason: redness to breast/abdom. folds


   Stop: 10/25/19 16:57


Pantoprazole Sodium (Protonix)  40 mg PO DAILY ORLY


   Stop: 10/22/19 08:59


   Last Admin: 19 09:24 Dose:  40 mg


Pregabalin (Lyrica)  50 mg PO BID ORLY


   Stop: 10/22/19 08:59


   Last Admin: 19 18:14 Dose:  Not Given


Quetiapine Fumarate (Seroquel)  25 mg PO HS ORLY; Protocol


   Stop: 10/22/19 20:59


   Last Admin: 19 20:37 Dose:  25 mg


Tramadol HCl (Ultram)  50 mg PO Q6HR PRN


   PRN Reason: Pain (Severe)


   Stop: 10/22/19 18:41


   Last Admin: 19 11:42 Dose:  50 mg


Trazodone HCl (Desyrel)  50 mg PO HS ORLY; Protocol


   Stop: 10/22/19 20:59


   Last Admin: 19 20:38 Dose:  50 mg


Zolpidem Tartrate (Ambien)  5 mg PO HS PRN


   PRN Reason: Insomnia


   Stop: 10/21/19 21:31








General: alert


HEENT: NC/AT, PERRLA, EOMI, anicteric sclerae, throat clear


Neck: Supple, No JVD, No thyromegaly, +2 carotid pulse wo bruit, No LAD


Cardiovascular: RRR, Normal S1, Normal S2, without murmur


Abdomen: soft, non-tender, non-distended


Extremities: clear


Neurological: no change





Internal Medicine Assmt/Plan





- Assessment


Assessment: 





1.HYPOTHYROIDISM.


2.DJD.


3.MALLNUTRITION.


4.PSYCHOSIS





- Plan


Plan: 





CONTINUE ON CURRENT MEDICATION AND DIET.





Nutritional Asmnt/Malnutr-PDOC





- Dietary Evaluation


Malnutrition Findings (Please click <Entered> for more info): 








Nutritional Asmnt/Malnutrition                             Start:  19 11:

46


Text:                                                      Status: Complete    

  


Freq:                                                                          

  


Protocol:                                                                      

  


 Document     19 11:46  NICOLA  (Rec: 19 11:55  NICOLA [id number])


 Nutritional Asmnt/Malnutrition


     Patient General Information


      Nutritional Screening                      Moderate Risk


                                                 Consult


      Diagnosis                                  Psychosis NOS


      Pertinent Medical Hx/Surgical Hx           HTN, Diverticulosis, DJD,


                                                 hypothyroidism, GERD, Dementia


      Subjective Information                     Consult: low lab results


                                                 Pt is a 84-year-old female


                                                 admitted on  d/t


                                                 mechanical fall with ALOC. Pt


                                                 is eating 72% of meals x3 days


                                                 Per Meal/Nutrition Activity


                                                 Record. Pt currently receives


                                                 Ensure TID, d/t good PO intake


                                                 , the extra kcals and Pro are


                                                 unnecessary. Visited Pt at


                                                 bedside after breakfast time,


                                                 she was fast asleep- RN stated


                                                 she did not sleep well last


                                                 night. Recommended to RN,


                                                 Eleanor, to speak with MD


                                                 pertaining to the supplement


                                                 order.


                                                 HT: 5 FT


                                                 WT:140 LB (63.64 kg)


                                                 BMI: 27.37 (Overweight)


                                                 GI: Soft, Non-tender


                                                 BM: 8/26 x2


                                                 I/O: 120/Not Noted


                                                 Skin: Rash, burning, itching,


                                                 dryness, redness, flaking,


                                                 bruises. Excoriation in


                                                 buttocks with dryness and


                                                 peeling


                                                 Chao: 14


                                                 Diet Order: Cardiac, Chopped,


                                                 Ensure Enlive TID


                                                 Estimated Energy Needs: (


                                                 Geriatric, CBW)


                                                 9292-8082 kcals (25-30 kcals/


                                                 kg)


                                                 64-76g Pro (1.0-1.2 g/kg)


                                                 8410-2353 ml (25-30 ml/kg)


      Current Diet Order/ Nutrition Support      Cardiac, Chopped, Ensure


                                                 Enlive TID


      Pertinent Medications                      Maalox (PRN), Coreg, Lasix,


                                                 Synthroid, MOM (PRN), Megace,


                                                 Theragran, Protonix


      Pertinent Labs                             : Hgb/Hct 11.4/33.6, Na


                                                 131, BUN/Cr 7/0.5, Glucose 204


                                                 , Ca 8.4, AST 8, ALT 4, T Pro


                                                 5.6, Alb 2.9


     Nutritional Hx/Data


      Height                                     12.7 cm


      Height (Calculated Centimeters)            12.7


      Current Weight (lbs)                       63.503 kg


      Weight (Calculated Kilograms)              63.5


      Weight (Calculated Grams)                  74428.9


      Ideal Body Weight                          100 LB (45.45 kg)


      % Ideal Body Weight                        140


      Body Mass Index (BMI)                      3936.8


      Weight Status                              Overweight


     GI Symptoms


      GI Symptoms                                None


      Last BM                                    8/26 x2


      Skin Integrity/Comment:                    Skin: Rash, burning, itching,


                                                 dryness, redness, flaking,


                                                 bruises. Excoriation in


                                                 buttocks with dryness and


                                                 peeling


                                                 Chao: 14


      Current %PO                                Fair (50-74%)


     Estimated Nutritional Goals


      BEE in Kcals:                              Using Current wt


      Calories/Kcals/Kg                          0922-5274


      Kcals Calculated                           25-30


      Protein:                                   Using Current wt


      Protein g/k.0-1.2


      Protein Calculated                         64-76


      Fluid: ml                                  2954-7057 ml (25-30 ml/kg)


     Nutritional Problem


      1. Problem


       Problem                                   Altered nutrition related labs


       Etiology                                  r/t pathophysiological causes


       Signs/Symptoms:                           aeb : Hgb/Hct 11.4/33.6, Na


                                                 131, BUN/Cr 7/0.5, Glucose 204


                                                 , Ca 8.4, AST 8, ALT 4, T Pro


                                                 5.6, Alb 2.9


     Malnutrition Related to Morbid Obesity


      Malnutrition related to morbid obesity     No


     Intervention/Recommendation


      Comments                                   1. Continue with Cardiac,


                                                 Chopped, Ensure Enlive TID


                                                 diet as ordered.


                                                 2. Recommend d/c the Ensure


                                                 Enlive TID as PT PO intake is


                                                 adequate to estimated meet


                                                 nutritional needs.


     Expected Outcomes/Goals


      Expected Outcomes/Goals                    1. PO intake to meet 75% of


                                                 nutritional needs.


                                                 2. Monitor PO intake, wt,


                                                 nutrition related labs, and


                                                 skin integrity to trend WNL.


                                                 3. F/U as moderate risk in 3-5


                                                 days, -

## 2019-08-29 RX ADMIN — NYSTATIN SCH: 100000 CREAM TOPICAL at 08:58

## 2019-08-29 RX ADMIN — PANTOPRAZOLE SODIUM SCH MG: 40 TABLET, DELAYED RELEASE ORAL at 08:56

## 2019-08-30 RX ADMIN — PANTOPRAZOLE SODIUM SCH MG: 40 TABLET, DELAYED RELEASE ORAL at 09:24

## 2019-08-30 RX ADMIN — NYSTATIN SCH APPL: 100000 CREAM TOPICAL at 09:26

## 2019-08-30 NOTE — DIAGNOSTIC IMAGING REPORT
Right hand (3 views)



HISTORY: Pain, swelling



Generalized soft tissue swelling.



Marked generalized osteoporosis.  Extensive cystic changes noted within

the carpal bones and within the distal radius and ulna.  Findings

probably degenerative.  Marked narrowing of the radiocarpal joint space.

 Chondrocalcinosis is seen.  Marked narrowing of all DIP and PIP joint

spaces.  No acute abnormalities.  No fractures.



IMPRESSION:

1.  Extensive chronic and degenerative changes as noted above

2.  Multiple small radiolucencies within the carpal bones and distal

radius/ulna probably degenerative.



In the presence of recent trauma and persistent symptoms, a repeat

radiograph in 5-7 days may be helpful for detection of a subtle or

occult fracture.

## 2019-08-30 NOTE — PROGRESS NOTES
DATE:  08/30/2019



SUBJECTIVE:  An 84-year-old female coming from [place] with agitation,

irritability, believing she was kidnapped, confused, easily agitated.  On

face-to-face, the patient remains angry, upset, still tangential, not really

making much sense on exam, poor orientation, confused.  It is unclear what her

discharge plan is going to be.  Apparently, the patient's daughter is involved

and the daughter wants to go to [facility].  The patient is still

easily agitated, very unpredictable, labile mood.  Medications were noted.



PLAN:  We will continue to monitor, therefore, on an inpatient unit. 

Medications were reviewed.





DD: 08/30/2019 15:23

DT: 08/30/2019 22:48

JOB# [id number]  [id number]

## 2019-08-30 NOTE — INTERNAL MEDICINE PROG NOTE
Internal Medicine Subjective





- Subjective


Service Date: 19


Patient seen and examined:: without staff (SHE STILL HAS PAIN AT RT 

SHOULER.XRAY SIGNIFICANT FOT DSTEOARTHRITIS)


Patient is:: verbal, in bed, confused


Per staff patient has:: no adverse event





Internal Medicine Objective





- Results


Result Diagrams: 


 19 17:19





 19 17:19


Recent Labs: 


 Laboratory Last Values











WBC  6.4 Th/cmm (4.8-10.8)   19  17:    


 


RBC  4.24 Mil/cmm (3.80-5.20)   19  17:19    


 


Hgb  11.4 gm/dL (12-16)  L  19  17:19    


 


Hct  33.6 % (41.0-60)  L  19  17:19    


 


MCV  79.1 fl ()  L  19  17:19    


 


MCH  26.8 pg (27.0-31.0)  L  19  17:19    


 


MCHC Differential  33.9 pg (28.0-36.0)   19  17:19    


 


RDW  16.0 % (11.5-20.0)   19  17:19    


 


Plt Count  466 Th/cmm (150-400)  H  19  17:19    


 


MPV  5.9 fl  19  17:19    


 


Neutrophils %  78.4 % (40.0-80.0)   19  17:19    


 


Lymphocytes %  11.1 % (20.0-50.0)  L  19  17:19    


 


Monocytes %  9.0 % (2.0-10.0)   19  17:19    


 


Eosinophils %  1.1 % (0.0-5.0)   19  17:19    


 


Basophils %  0.4 % (0.0-2.0)   19  17:19    


 


Sodium  131 mEq/L (136-145)  L  19  17:19    


 


Potassium  3.7 mEq/L (3.5-5.1)   19  17:19    


 


Chloride  97 mEq/L ()  L  19  17:19    


 


Carbon Dioxide  25.4 mEq/L (21.0-31.0)   19  17:19    


 


Anion Gap  12.3  (7.0-16.0)   19  17:19    


 


BUN  7 mg/dL (7-25)   19  17:19    


 


Creatinine  0.5 mg/dL (0.6-1.2)  L  19  17:19    


 


Est GFR ( Amer)  TNP   19  17:19    


 


Est GFR (Non-Af Amer)  TNP   19  17:19    


 


BUN/Creatinine Ratio  14.0   19  17:19    


 


Glucose  204 mg/dL ()  H  19  17:19    


 


Calcium  8.4 mg/dL (8.6-10.3)  L  19  17:19    


 


Total Bilirubin  0.3 mg/dL (0.3-1.0)   19  17:19    


 


AST  8 U/L (13-39)  L  19  17:19    


 


ALT  4 U/L (7-52)  L  19  17:19    


 


Alkaline Phosphatase  77 U/L ()   19  17:19    


 


Total Protein  5.6 gm/dL (6.0-8.3)  L  19  17:19    


 


Albumin  2.9 gm/dL (3.7-5.3)  L  19  17:19    


 


Globulin  2.7 gm/dL  19  17:19    


 


Albumin/Globulin Ratio  1.1  (1.0-1.8)   19  17:19    


 


Triglycerides  145 mg/dL (<150)   19  06:48    


 


Cholesterol  145 mg/dL (<200)   19  06:48    


 


LDL Cholesterol Direct  103 mg/dL ()   19  06:48    


 


HDL Cholesterol  27 mg/dL (23-92)   19  06:48    














- Physical Exam


Vitals and I&O: 


 Vital Signs











Temp  98.8 F   19 14:00


 


Pulse  97   19 16:50


 


Resp  20   19 14:00


 


BP  102/52   19 16:50


 


Pulse Ox  97   19 14:00








 Intake & Output











 19





 18:59 06:59 18:59


 


Intake Total 960 120 


 


Balance 960 120 


 


Intake:   


 


  Oral 960 120 


 


Other:   


 


  # Voids 3 3 


 


  # Bowel Movements 1  


 


  Stool Characteristics Formed  Formed





 Brown  Brown











Active Medications: 


Current Medications





Acetaminophen (Tylenol)  650 mg PO Q4HR PRN


   PRN Reason: Mild Pain / Temp above 100


   Stop: 10/21/19 21:31


   Last Admin: 19 20:38 Dose:  650 mg


Al Hydrox/Mg Hydrox/Simethicone (Maalox)  30 ml PO Q4HR PRN


   PRN Reason: GI DISTRESS


   Stop: 10/21/19 21:31


Carvedilol (Coreg)  3.125 mg PO BID ORLY


   Stop: 10/21/19 22:14


   Last Admin: 19 16:50 Dose:  Not Given


Furosemide (Lasix)  20 mg PO DAILY ORLY


   Stop: 10/22/19 08:59


   Last Admin: 19 09:25 Dose:  20 mg


Haloperidol Lactate (Haldol)  2 mg IM Q6HR PRN


   PRN Reason: Agitation


   Stop: 10/21/19 21:40


Levothyroxine Sodium 0.1 mg/ (Levothyroxine Sodium 0.075 mg)  0.175 mg PO QDAC 

ORLY


   Stop: 10/28/19 07:29


   Last Admin: 19 07:07 Dose:  0.175 mg


Lorazepam (Ativan)  0.5 mg PO Q4HR PRN; Protocol


   PRN Reason: Anxiety


   Stop: 19 21:31


   Last Admin: 19 09:25 Dose:  0.5 mg


Magnesium Hydroxide (Milk Of Magnesia)  30 ml PO HS PRN


   PRN Reason: Constipation


Megestrol Acetate (Megace)  400 mg PO BID ORLY; Protocol


   Stop: 10/25/19 08:59


   Last Admin: 19 16:49 Dose:  400 mg


Mirtazapine (Remeron)  15 mg PO HS ORLY; Protocol


   Stop: 10/22/19 20:59


   Last Admin: 19 21:02 Dose:  15 mg


Multivitamins/Vitamin C (Theragran)  1 tab PO DAILY ORLY


   Stop: 10/22/19 08:59


   Last Admin: 19 09:25 Dose:  1 tab


Nystatin (Mycostatin Cream)  1 appl TP DAILY ORLY


   Stop: 19 08:59


   Last Admin: 19 09:26 Dose:  1 appl


Nystatin (Nystop)  100 units TP DAILY PRN


   PRN Reason: redness to breast/abdom. folds


   Stop: 10/25/19 16:57


   Last Admin: 19 08:56 Dose:  100 units


Pantoprazole Sodium (Protonix)  40 mg PO DAILY ORLY


   Stop: 10/22/19 08:59


   Last Admin: 19 09:24 Dose:  40 mg


Pregabalin (Lyrica)  50 mg PO BID ORLY


   Stop: 10/22/19 08:59


   Last Admin: 19 16:59 Dose:  50 mg


Quetiapine Fumarate (Seroquel)  25 mg PO HS ORLY; Protocol


   Stop: 10/22/19 20:59


   Last Admin: 19 21:02 Dose:  25 mg


Tramadol HCl (Ultram)  50 mg PO Q6HR PRN


   PRN Reason: Pain (Severe)


   Stop: 10/22/19 18:41


   Last Admin: 19 11:42 Dose:  50 mg


Trazodone HCl (Desyrel)  50 mg PO HS ORLY; Protocol


   Stop: 10/22/19 20:59


   Last Admin: 19 21:02 Dose:  50 mg


Zolpidem Tartrate (Ambien)  5 mg PO HS PRN


   PRN Reason: Insomnia


   Stop: 10/21/19 21:31








General: alert


HEENT: NC/AT, PERRLA, EOMI, anicteric sclerae, throat clear


Neck: Supple, No JVD, No thyromegaly, +2 carotid pulse wo bruit, No LAD


Cardiovascular: RRR, Normal S1, Normal S2, without murmur


Abdomen: soft, non-tender, non-distended


Extremities: clear


Neurological: no change





Internal Medicine Assmt/Plan





- Assessment


Assessment: 





1.HYPOTHYROIDISM.


2.OSEOARTHRITIS OF RT SHOULDER


3.MALNUTRITION.


4.SKIN LESION ON RT UPPER BACK .


5.RT SHOULDER PAIN.


6.PSYCHOSIS





- Plan


Plan: 





CONTINUE ON CURRENT MEDICATION AND DIET.LIDOCAIN PATCH TO BE APPLIED ON RT 

SHOULDER





Nutritional Asmnt/Malnutr-PDOC





- Dietary Evaluation


Malnutrition Findings (Please click <Entered> for more info): 








Nutritional Asmnt/Malnutrition                             Start:  19 11:

46


Text:                                                      Status: Complete    

  


Freq:                                                                          

  


Protocol:                                                                      

  


 Document     19 11:46  NICOLA  (Rec: 19 11:55  NICOLA  [id number])


 Nutritional Asmnt/Malnutrition


     Patient General Information


      Nutritional Screening                      Moderate Risk


                                                 Consult


      Diagnosis                                  Psychosis NOS


      Pertinent Medical Hx/Surgical Hx           HTN, Diverticulosis, DJD,


                                                 hypothyroidism, GERD, Dementia


      Subjective Information                     Consult: low lab results


                                                 Pt is a 84-year-old female


                                                 admitted on  d/t


                                                 mechanical fall with ALOC. Pt


                                                 is eating 72% of meals x3 days


                                                 Per Meal/Nutrition Activity


                                                 Record. Pt currently receives


                                                 Ensure TID, d/t good PO intake


                                                 , the extra kcals and Pro are


                                                 unnecessary. Visited Pt at


                                                 bedside after breakfast time,


                                                 she was fast asleep- RN stated


                                                 she did not sleep well last


                                                 night. Recommended to RNEleanor, to speak with MD


                                                 pertaining to the supplement


                                                 order.


                                                 HT: 5 FT


                                                 WT:140 LB (63.64 kg)


                                                 BMI: 27.37 (Overweight)


                                                 GI: Soft, Non-tender


                                                 BM: 8/26 x2


                                                 I/O: 120/Not Noted


                                                 Skin: Rash, burning, itching,


                                                 dryness, redness, flaking,


                                                 bruises. Excoriation in


                                                 buttocks with dryness and


                                                 peeling


                                                 Chao: 14


                                                 Diet Order: Cardiac, Chopped,


                                                 Ensure Enlive TID


                                                 Estimated Energy Needs: (


                                                 Geriatric, CBW)


                                                 7135-5871 kcals (25-30 kcals/


                                                 kg)


                                                 64-76g Pro (1.0-1.2 g/kg)


                                                 3040-9729 ml (25-30 ml/kg)


      Current Diet Order/ Nutrition Support      Cardiac, Chopped, Ensure


                                                 Enlive TID


      Pertinent Medications                      Maalox (PRN), Coreg, Lasix,


                                                 Synthroid, MOM (PRN), Megace,


                                                 Theragran, Protonix


      Pertinent Labs                             : Hgb/Hct 11.4/33.6, Na


                                                 131, BUN/Cr 7/0.5, Glucose 204


                                                 , Ca 8.4, AST 8, ALT 4, T Pro


                                                 5.6, Alb 2.9


     Nutritional Hx/Data


      Height                                     12.7 cm


      Height (Calculated Centimeters)            12.7


      Current Weight (lbs)                       63.503 kg


      Weight (Calculated Kilograms)              63.5


      Weight (Calculated Grams)                  86091.9


      Ideal Body Weight                          100 LB (45.45 kg)


      % Ideal Body Weight                        140


      Body Mass Index (BMI)                      3936.8


      Weight Status                              Overweight


     GI Symptoms


      GI Symptoms                                None


      Last BM                                    8/26 x2


      Skin Integrity/Comment:                    Skin: Rash, burning, itching,


                                                 dryness, redness, flaking,


                                                 bruises. Excoriation in


                                                 buttocks with dryness and


                                                 peeling


                                                 Chao: 14


      Current %PO                                Fair (50-74%)


     Estimated Nutritional Goals


      BEE in Kcals:                              Using Current wt


      Calories/Kcals/Kg                          8165-7976


      Kcals Calculated                           25-30


      Protein:                                   Using Current wt


      Protein g/k.0-1.2


      Protein Calculated                         64-76


      Fluid: ml                                  5179-1791 ml (25-30 ml/kg)


     Nutritional Problem


      1. Problem


       Problem                                   Altered nutrition related labs


       Etiology                                  r/t pathophysiological causes


       Signs/Symptoms:                           aeb : Hgb/Hct 11.4/33.6, Na


                                                 131, BUN/Cr 7/0.5, Glucose 204


                                                 , Ca 8.4, AST 8, ALT 4, T Pro


                                                 5.6, Alb 2.9


     Malnutrition Related to Morbid Obesity


      Malnutrition related to morbid obesity     No


     Intervention/Recommendation


      Comments                                   1. Continue with Cardiac,


                                                 Chopped, Ensure Enlive TID


                                                 diet as ordered.


                                                 2. Recommend d/c the Ensure


                                                 Enlive TID as PT PO intake is


                                                 adequate to estimated meet


                                                 nutritional needs.


     Expected Outcomes/Goals


      Expected Outcomes/Goals                    1. PO intake to meet 75% of


                                                 nutritional needs.


                                                 2. Monitor PO intake, wt,


                                                 nutrition related labs, and


                                                 skin integrity to trend WNL.


                                                 3. F/U as moderate risk in 3-5


                                                 days, -

## 2019-08-30 NOTE — PROGRESS NOTES
DATE:  08/29/2019



SUBJECTIVE:  An 84-year-old female, evaluated at [facility], agitation,

irritability, under the care of Dr. Patterson.  The patient really angry right now,

states she wants to get up and walk around.  She starts talking about her SSI

check.  The patient is getting more agitated, upset, starting to yell, difficult

to follow her thought process.  The patient was seen by Dr. Patterson yesterday,

noting she remained impulsive, unpredictable, ongoing concerns for paranoia. 

Ongoing safety concerns at this time.



ASSESSMENT:  The patient remains pretty impulsive and angry.  We are also trying

to get more collateral.  Medications were noted.





DD: 08/29/2019 14:29

DT: 08/29/2019 22:26

JOB# [id number]  [id number]

## 2019-08-31 RX ADMIN — PANTOPRAZOLE SODIUM SCH MG: 40 TABLET, DELAYED RELEASE ORAL at 09:00

## 2019-08-31 RX ADMIN — NYSTATIN SCH APPL: 100000 CREAM TOPICAL at 10:04

## 2019-08-31 RX ADMIN — PANTOPRAZOLE SODIUM SCH MG: 40 TABLET, DELAYED RELEASE ORAL at 09:59

## 2019-08-31 NOTE — PROGRESS NOTES
DATE:  08/31/2019



SUBJECTIVE:  The patient is currently in the hospital, extremely confused,

rambling nonsensically, disoriented, does not know where she is or what is going

on, just knows her name, needing a higher level of staff care, paranoid,

believing people are trying to harm her, telling me that other patient's trying

to harm her.  She is mostly in her room, keeps to herself, therefore ongoing

safety concerns, concerns about ongoing psychotic symptoms, extreme confusional

state, easily agitated, highly unpredictable.  Medications were reviewed.  We

will continue to monitor.  Continue dosing of tramadol and Seroquel.





DD: 08/31/2019 07:17

DT: 08/31/2019 19:08

[facility]# [id number]  [id number]

## 2019-08-31 NOTE — GENERAL PROGRESS NOTE
Subjective





- Review of Systems


Service Date: 19


Subjective: 





resting comfortably


no distress





Objective





- Results


Result Diagrams: 


 19 17:19





 19 17:19


Recent Labs: 


 Laboratory Last Values











WBC  6.4 Th/cmm (4.8-10.8)   19  17:19    


 


RBC  4.24 Mil/cmm (3.80-5.20)   19  17:19    


 


Hgb  11.4 gm/dL (12-16)  L  19  17:19    


 


Hct  33.6 % (41.0-60)  L  19  17:19    


 


MCV  79.1 fl ()  L  19  17:19    


 


MCH  26.8 pg (27.0-31.0)  L  19  17:19    


 


MCHC Differential  33.9 pg (28.0-36.0)   19  17:19    


 


RDW  16.0 % (11.5-20.0)   19  17:19    


 


Plt Count  466 Th/cmm (150-400)  H  19  17:19    


 


MPV  5.9 fl  19  17:19    


 


Neutrophils %  78.4 % (40.0-80.0)   19  17:19    


 


Lymphocytes %  11.1 % (20.0-50.0)  L  19  17:19    


 


Monocytes %  9.0 % (2.0-10.0)   19  17:19    


 


Eosinophils %  1.1 % (0.0-5.0)   19  17:19    


 


Basophils %  0.4 % (0.0-2.0)   19  17:19    


 


Sodium  131 mEq/L (136-145)  L  19  17:19    


 


Potassium  3.7 mEq/L (3.5-5.1)   19  17:19    


 


Chloride  97 mEq/L ()  L  19  17:19    


 


Carbon Dioxide  25.4 mEq/L (21.0-31.0)   19  17:19    


 


Anion Gap  12.3  (7.0-16.0)   19  17:19    


 


BUN  7 mg/dL (7-25)   19  17:19    


 


Creatinine  0.5 mg/dL (0.6-1.2)  L  19  17:19    


 


Est GFR ( Amer)  TNP   19  17:19    


 


Est GFR (Non-Af Amer)  TNP   19  17:19    


 


BUN/Creatinine Ratio  14.0   19  17:19    


 


Glucose  204 mg/dL ()  H  19  17:19    


 


Calcium  8.4 mg/dL (8.6-10.3)  L  19  17:19    


 


Total Bilirubin  0.3 mg/dL (0.3-1.0)   19  17:19    


 


AST  8 U/L (13-39)  L  19  17:19    


 


ALT  4 U/L (7-52)  L  19  17:19    


 


Alkaline Phosphatase  77 U/L ()   19  17:19    


 


Total Protein  5.6 gm/dL (6.0-8.3)  L  19  17:19    


 


Albumin  2.9 gm/dL (3.7-5.3)  L  19  17:19    


 


Globulin  2.7 gm/dL  19  17:19    


 


Albumin/Globulin Ratio  1.1  (1.0-1.8)   19  17:19    


 


Triglycerides  145 mg/dL (<150)   19  06:48    


 


Cholesterol  145 mg/dL (<200)   19  06:48    


 


LDL Cholesterol Direct  103 mg/dL ()   19  06:48    


 


HDL Cholesterol  27 mg/dL (23-92)   19  06:48    














- Physical Exam


Vitals and I&O: 


 Vital Signs











Temp  97.9 F   19 15:49


 


Pulse  92   19 15:49


 


Resp  20   19 15:49


 


BP  98/71   19 15:49


 


Pulse Ox  97   19 15:49








 Intake & Output











 19





 18:59 06:59 18:59


 


Intake Total 900  


 


Balance 900  


 


Intake:   


 


  Oral 900  


 


Other:   


 


  # Voids 3  


 


  # Bowel Movements 1  


 


  Stool Characteristics Formed  





 Brown  











Active Medications: 


Current Medications





Acetaminophen (Tylenol)  650 mg PO Q4HR PRN


   PRN Reason: Mild Pain / Temp above 100


   Stop: 10/21/19 21:31


   Last Admin: 19 20:38 Dose:  650 mg


Al Hydrox/Mg Hydrox/Simethicone (Maalox)  30 ml PO Q4HR PRN


   PRN Reason: GI DISTRESS


   Stop: 10/21/19 21:31


Carvedilol (Coreg)  3.125 mg PO BID ORLY


   Stop: 10/21/19 22:14


   Last Admin: 19 09:00 Dose:  3.125 mg


Furosemide (Lasix)  20 mg PO DAILY ORLY


   Stop: 10/22/19 08:59


   Last Admin: 19 09:00 Dose:  20 mg


Haloperidol Lactate (Haldol)  2 mg IM Q6HR PRN


   PRN Reason: Agitation


   Stop: 10/21/19 21:40


Levothyroxine Sodium 0.1 mg/ (Levothyroxine Sodium 0.075 mg)  0.175 mg PO QDAC 

ORLY


   Stop: 10/28/19 07:29


   Last Admin: 19 06:49 Dose:  0.175 mg


Lidocaine (Lidoderm 5% Patch)  1 patch TD DAILY ORLY


   Stop: 10/30/19 08:59


   Last Admin: 19 09:00 Dose:  1 patch


Lorazepam (Ativan)  0.5 mg PO Q4HR PRN; Protocol


   PRN Reason: Anxiety


   Stop: 19 21:31


   Last Admin: 19 09:25 Dose:  0.5 mg


Magnesium Hydroxide (Milk Of Magnesia)  30 ml PO HS PRN


   PRN Reason: Constipation


Megestrol Acetate (Megace)  400 mg PO BID ORLY; Protocol


   Stop: 10/25/19 08:59


   Last Admin: 19 09:00 Dose:  400 mg


Mirtazapine (Remeron)  15 mg PO HS ORLY; Protocol


   Stop: 10/22/19 20:59


   Last Admin: 19 20:55 Dose:  15 mg


Multivitamins/Vitamin C (Theragran)  1 tab PO DAILY ORLY


   Stop: 10/22/19 08:59


   Last Admin: 19 09:00 Dose:  1 tab


Nystatin (Mycostatin Cream)  1 appl TP DAILY ORLY


   Stop: 19 08:59


   Last Admin: 19 10:04 Dose:  1 appl


Nystatin (Nystop)  100 units TP DAILY PRN


   PRN Reason: redness to breast/abdom. folds


   Stop: 10/25/19 16:57


   Last Admin: 19 08:56 Dose:  100 units


Pantoprazole Sodium (Protonix)  40 mg PO DAILY ORLY


   Stop: 10/22/19 08:59


   Last Admin: 19 09:00 Dose:  40 mg


Pregabalin (Lyrica)  50 mg PO BID ORLY


   Stop: 10/22/19 08:59


   Last Admin: 19 09:00 Dose:  50 mg


Quetiapine Fumarate (Seroquel)  25 mg PO HS ORLY; Protocol


   Stop: 10/22/19 20:59


   Last Admin: 19 20:55 Dose:  25 mg


Tramadol HCl (Ultram)  50 mg PO Q6HR PRN


   PRN Reason: Pain (Severe)


   Stop: 10/22/19 18:41


   Last Admin: 19 11:42 Dose:  50 mg


Trazodone HCl (Desyrel)  50 mg PO HS ORLY; Protocol


   Stop: 10/22/19 20:59


   Last Admin: 19 20:55 Dose:  50 mg


Zolpidem Tartrate (Ambien)  5 mg PO HS PRN


   PRN Reason: Insomnia


   Stop: 10/21/19 21:31








General: No acute distress


HEENT: Atraumatic, PERRLA


Neck: Supple, JVD, Thyromegaly


Cardiovascular: Regular rate, Normal S1, Normal S2


Lungs: Clear to auscultation


Abdomen: Bowel sounds, Soft





Assessment/Plan





- Assessment


Assessment: 





1.HYPOTHYROIDISM.


2.OSEOARTHRITIS OF RT SHOULDER


3.MALNUTRITION.


4.SKIN LESION ON RT UPPER BACK .


5.RT SHOULDER PAIN.


6.PSYCHOSIS





- Plan


Plan: 





continue current treatment





Nutritional Asmnt/Malnutr-PDOC





- Dietary Evaluation


Malnutrition Findings (Please click <Entered> for more info): 








Nutritional Asmnt/Malnutrition                             Start:  19 11:

46


Text:                                                      Status: Complete    

  


Freq:                                                                          

  


Protocol:                                                                      

  


 Document     19 11:46  NICOLA  (Rec: 19 11:55  NICOLA [id number])


 Nutritional Asmnt/Malnutrition


     Patient General Information


      Nutritional Screening                      Moderate Risk


                                                 Consult


      Diagnosis                                  Psychosis NOS


      Pertinent Medical Hx/Surgical Hx           HTN, Diverticulosis, DJD,


                                                 hypothyroidism, GERD, Dementia


      Subjective Information                     Consult: low lab results


                                                 Pt is a 84-year-old female


                                                 admitted on  d/t


                                                 mechanical fall with ALOC. Pt


                                                 is eating 72% of meals x3 days


                                                 Per Meal/Nutrition Activity


                                                 Record. Pt currently receives


                                                 Ensure TID, d/t good PO intake


                                                 , the extra kcals and Pro are


                                                 unnecessary. Visited Pt at


                                                 bedside after breakfast time,


                                                 she was fast asleep- RN stated


                                                 she did not sleep well last


                                                 night. Recommended to RN,


                                                 Eleanor, to speak with MD


                                                 pertaining to the supplement


                                                 order.


                                                 HT: 5 FT


                                                 WT:140 LB (63.64 kg)


                                                 BMI: 27.37 (Overweight)


                                                 GI: Soft, Non-tender


                                                 BM: 8/26 x2


                                                 I/O: 120/Not Noted


                                                 Skin: Rash, burning, itching,


                                                 dryness, redness, flaking,


                                                 bruises. Excoriation in


                                                 buttocks with dryness and


                                                 peeling


                                                 Chao: 14


                                                 Diet Order: Cardiac, Chopped,


                                                 Ensure Enlive TID


                                                 Estimated Energy Needs: (


                                                 Geriatric, CBW)


                                                 1542-3361 kcals (25-30 kcals/


                                                 kg)


                                                 64-76g Pro (1.0-1.2 g/kg)


                                                 9248-0659 ml (25-30 ml/kg)


      Current Diet Order/ Nutrition Support      Cardiac, Chopped, Ensure


                                                 Enlive TID


      Pertinent Medications                      Maalox (PRN), Coreg, Lasix,


                                                 Synthroid, MOM (PRN), Megace,


                                                 Theragran, Protonix


      Pertinent Labs                             : Hgb/Hct 11.4/33.6, Na


                                                 131, BUN/Cr 7/0.5, Glucose 204


                                                 , Ca 8.4, AST 8, ALT 4, T Pro


                                                 5.6, Alb 2.9


     Nutritional Hx/Data


      Height                                     12.7 cm


      Height (Calculated Centimeters)            12.7


      Current Weight (lbs)                       63.503 kg


      Weight (Calculated Kilograms)              63.5


      Weight (Calculated Grams)                  43665.9


      Ideal Body Weight                          100 LB (45.45 kg)


      % Ideal Body Weight                        140


      Body Mass Index (BMI)                      3936.8


      Weight Status                              Overweight


     GI Symptoms


      GI Symptoms                                None


      Last BM                                    8/26 x2


      Skin Integrity/Comment:                    Skin: Rash, burning, itching,


                                                 dryness, redness, flaking,


                                                 bruises. Excoriation in


                                                 buttocks with dryness and


                                                 peeling


                                                 Chao: 14


      Current %PO                                Fair (50-74%)


     Estimated Nutritional Goals


      BEE in Kcals:                              Using Current wt


      Calories/Kcals/Kg                          3694-6752


      Kcals Calculated                           25-30


      Protein:                                   Using Current wt


      Protein g/k.0-1.2


      Protein Calculated                         64-76


      Fluid: ml                                  1618-7889 ml (25-30 ml/kg)


     Nutritional Problem


      1. Problem


       Problem                                   Altered nutrition related labs


       Etiology                                  r/t pathophysiological causes


       Signs/Symptoms:                           aeb : Hgb/Hct 11.4/33.6, Na


                                                 131, BUN/Cr 7/0.5, Glucose 204


                                                 , Ca 8.4, AST 8, ALT 4, T Pro


                                                 5.6, Alb 2.9


     Malnutrition Related to Morbid Obesity


      Malnutrition related to morbid obesity     No


     Intervention/Recommendation


      Comments                                   1. Continue with Cardiac,


                                                 Chopped, Ensure Enlive TID


                                                 diet as ordered.


                                                 2. Recommend d/c the Ensure


                                                 Enlive TID as PT PO intake is


                                                 adequate to estimated meet


                                                 nutritional needs.


     Expected Outcomes/Goals


      Expected Outcomes/Goals                    1. PO intake to meet 75% of


                                                 nutritional needs.


                                                 2. Monitor PO intake, wt,


                                                 nutrition related labs, and


                                                 skin integrity to trend WNL.


                                                 3. F/U as moderate risk in 3-5


                                                 days, -

## 2019-09-01 RX ADMIN — PANTOPRAZOLE SODIUM SCH MG: 40 TABLET, DELAYED RELEASE ORAL at 09:16

## 2019-09-01 NOTE — GENERAL PROGRESS NOTE
Subjective





- Review of Systems


Service Date: 19


Subjective: 





resting comfortably


no distress





Objective





- Results


Result Diagrams: 


 19 17:19





 19 17:19


Recent Labs: 


 Laboratory Last Values











WBC  6.4 Th/cmm (4.8-10.8)   19  17:19    


 


RBC  4.24 Mil/cmm (3.80-5.20)   19  17:19    


 


Hgb  11.4 gm/dL (12-16)  L  19  17:19    


 


Hct  33.6 % (41.0-60)  L  19  17:19    


 


MCV  79.1 fl ()  L  19  17:19    


 


MCH  26.8 pg (27.0-31.0)  L  19  17:19    


 


MCHC Differential  33.9 pg (28.0-36.0)   19  17:19    


 


RDW  16.0 % (11.5-20.0)   19  17:19    


 


Plt Count  466 Th/cmm (150-400)  H  19  17:19    


 


MPV  5.9 fl  19  17:19    


 


Neutrophils %  78.4 % (40.0-80.0)   19  17:19    


 


Lymphocytes %  11.1 % (20.0-50.0)  L  19  17:19    


 


Monocytes %  9.0 % (2.0-10.0)   19  17:19    


 


Eosinophils %  1.1 % (0.0-5.0)   19  17:19    


 


Basophils %  0.4 % (0.0-2.0)   19  17:19    


 


Sodium  131 mEq/L (136-145)  L  19  17:19    


 


Potassium  3.7 mEq/L (3.5-5.1)   19  17:19    


 


Chloride  97 mEq/L ()  L  19  17:19    


 


Carbon Dioxide  25.4 mEq/L (21.0-31.0)   19  17:19    


 


Anion Gap  12.3  (7.0-16.0)   19  17:19    


 


BUN  7 mg/dL (7-25)   19  17:19    


 


Creatinine  0.5 mg/dL (0.6-1.2)  L  19  17:19    


 


Est GFR ( Amer)  TNP   19  17:19    


 


Est GFR (Non-Af Amer)  TNP   19  17:19    


 


BUN/Creatinine Ratio  14.0   19  17:19    


 


Glucose  204 mg/dL ()  H  19  17:19    


 


Calcium  8.4 mg/dL (8.6-10.3)  L  19  17:19    


 


Total Bilirubin  0.3 mg/dL (0.3-1.0)   19  17:19    


 


AST  8 U/L (13-39)  L  19  17:19    


 


ALT  4 U/L (7-52)  L  19  17:19    


 


Alkaline Phosphatase  77 U/L ()   19  17:19    


 


Total Protein  5.6 gm/dL (6.0-8.3)  L  19  17:19    


 


Albumin  2.9 gm/dL (3.7-5.3)  L  19  17:19    


 


Globulin  2.7 gm/dL  19  17:19    


 


Albumin/Globulin Ratio  1.1  (1.0-1.8)   19  17:19    


 


Triglycerides  145 mg/dL (<150)   19  06:48    


 


Cholesterol  145 mg/dL (<200)   19  06:48    


 


LDL Cholesterol Direct  103 mg/dL ()   19  06:48    


 


HDL Cholesterol  27 mg/dL (23-92)   19  06:48    














- Physical Exam


Vitals and I&O: 


 Vital Signs











Temp  98.2 F   19 06:22


 


Pulse  102   19 09:16


 


Resp  20   19 06:22


 


BP  113/72   19 09:16


 


Pulse Ox  96   19 06:22








 Intake & Output











 19





 18:59 06:59 18:59


 


Intake Total 1000 840 


 


Balance 1000 840 


 


Intake:   


 


  Oral 1000 840 


 


Other:   


 


  # Voids 4 1 


 


  # Bowel Movements 1 1 











Active Medications: 


Current Medications





Acetaminophen (Tylenol)  650 mg PO Q4HR PRN


   PRN Reason: Mild Pain / Temp above 100


   Stop: 10/21/19 21:31


   Last Admin: 19 20:38 Dose:  650 mg


Al Hydrox/Mg Hydrox/Simethicone (Maalox)  30 ml PO Q4HR PRN


   PRN Reason: GI DISTRESS


   Stop: 10/21/19 21:31


Carvedilol (Coreg)  3.125 mg PO BID ORLY


   Stop: 10/21/19 22:14


   Last Admin: 19 09:16 Dose:  3.125 mg


Furosemide (Lasix)  20 mg PO DAILY ORLY


   Stop: 10/22/19 08:59


   Last Admin: 19 09:15 Dose:  20 mg


Haloperidol Lactate (Haldol)  2 mg IM Q6HR PRN


   PRN Reason: Agitation


   Stop: 10/21/19 21:40


Levothyroxine Sodium 0.1 mg/ (Levothyroxine Sodium 0.075 mg)  0.175 mg PO QDAC 

ORLY


   Stop: 10/28/19 07:29


   Last Admin: 19 06:37 Dose:  0.175 mg


Lidocaine (Lidoderm 5% Patch)  1 patch TD DAILY ORLY


   Stop: 10/30/19 08:59


   Last Admin: 19 09:18 Dose:  1 patch


Lorazepam (Ativan)  0.5 mg PO Q4HR PRN; Protocol


   PRN Reason: Anxiety


   Stop: 19 21:31


   Last Admin: 19 09:25 Dose:  0.5 mg


Magnesium Hydroxide (Milk Of Magnesia)  30 ml PO HS PRN


   PRN Reason: Constipation


Megestrol Acetate (Megace)  400 mg PO BID ORLY; Protocol


   Stop: 10/25/19 08:59


   Last Admin: 19 09:16 Dose:  400 mg


Mirtazapine (Remeron)  15 mg PO HS ORLY; Protocol


   Stop: 10/22/19 20:59


   Last Admin: 19 21:30 Dose:  15 mg


Multivitamins/Vitamin C (Theragran)  1 tab PO DAILY ORLY


   Stop: 10/22/19 08:59


   Last Admin: 19 09:16 Dose:  1 tab


Nystatin (Nystop)  100 units TP DAILY PRN


   PRN Reason: redness to breast/abdom. folds


   Stop: 10/25/19 16:57


   Last Admin: 19 08:56 Dose:  100 units


Pantoprazole Sodium (Protonix)  40 mg PO DAILY ORLY


   Stop: 10/22/19 08:59


   Last Admin: 19 09:16 Dose:  40 mg


Pregabalin (Lyrica)  50 mg PO BID ORLY


   Stop: 10/22/19 08:59


   Last Admin: 19 09:16 Dose:  50 mg


Quetiapine Fumarate (Seroquel)  25 mg PO HS ORLY; Protocol


   Stop: 10/22/19 20:59


   Last Admin: 19 21:30 Dose:  25 mg


Tramadol HCl (Ultram)  50 mg PO Q6HR PRN


   PRN Reason: Pain (Severe)


   Stop: 10/22/19 18:41


   Last Admin: 19 11:42 Dose:  50 mg


Trazodone HCl (Desyrel)  50 mg PO HS ORLY; Protocol


   Stop: 10/22/19 20:59


   Last Admin: 19 21:30 Dose:  50 mg


Zolpidem Tartrate (Ambien)  5 mg PO HS PRN


   PRN Reason: Insomnia


   Stop: 10/21/19 21:31








General: No acute distress


HEENT: Atraumatic, PERRLA


Neck: Supple, JVD, Thyromegaly


Cardiovascular: Regular rate, Normal S1, Normal S2


Lungs: Clear to auscultation


Abdomen: Bowel sounds, Soft





Assessment/Plan





- Assessment


Assessment: 





1.HYPOTHYROIDISM.


2.OSEOARTHRITIS OF RT SHOULDER


3.MALNUTRITION.


4.SKIN LESION ON RT UPPER BACK .


5.RT SHOULDER PAIN.


6.PSYCHOSIS





- Plan


Plan: 





continue current treatment





Nutritional Asmnt/Malnutr-PDOC





- Dietary Evaluation


Malnutrition Findings (Please click <Entered> for more info): 








Nutritional Asmnt/Malnutrition                             Start:  19 11:

46


Text:                                                      Status: Complete    

  


Freq:                                                                          

  


Protocol:                                                                      

  


 Document     19 11:46  NICOLA  (Rec: 19 11:55  NICOLA [id number])


 Nutritional Asmnt/Malnutrition


     Patient General Information


      Nutritional Screening                      Moderate Risk


                                                 Consult


      Diagnosis                                  Psychosis NOS


      Pertinent Medical Hx/Surgical Hx           HTN, Diverticulosis, DJD,


                                                 hypothyroidism, GERD, Dementia


      Subjective Information                     Consult: low lab results


                                                 Pt is a 84-year-old female


                                                 admitted on  d/t


                                                 mechanical fall with ALOC. Pt


                                                 is eating 72% of meals x3 days


                                                 Per Meal/Nutrition Activity


                                                 Record. Pt currently receives


                                                 Ensure TID, d/t good PO intake


                                                 , the extra kcals and Pro are


                                                 unnecessary. Visited Pt at


                                                 bedside after breakfast time,


                                                 she was fast asleep- RN stated


                                                 she did not sleep well last


                                                 night. Recommended to RN,


                                                 Eleanor, to speak with MD


                                                 pertaining to the supplement


                                                 order.


                                                 HT: 5 FT


                                                 WT:140 LB (63.64 kg)


                                                 BMI: 27.37 (Overweight)


                                                 GI: Soft, Non-tender


                                                 BM: 8/26 x2


                                                 I/O: 120/Not Noted


                                                 Skin: Rash, burning, itching,


                                                 dryness, redness, flaking,


                                                 bruises. Excoriation in


                                                 buttocks with dryness and


                                                 peeling


                                                 Chao: 14


                                                 Diet Order: Cardiac, Chopped,


                                                 Ensure Enlive TID


                                                 Estimated Energy Needs: (


                                                 Geriatric, CBW)


                                                 7584-1189 kcals (25-30 kcals/


                                                 kg)


                                                 64-76g Pro (1.0-1.2 g/kg)


                                                 2633-0857 ml (25-30 ml/kg)


      Current Diet Order/ Nutrition Support      Cardiac, Chopped, Ensure


                                                 Enlive TID


      Pertinent Medications                      Maalox (PRN), Coreg, Lasix,


                                                 Synthroid, MOM (PRN), Megace,


                                                 Theragran, Protonix


      Pertinent Labs                             : Hgb/Hct 11.4/33.6, Na


                                                 131, BUN/Cr 7/0.5, Glucose 204


                                                 , Ca 8.4, AST 8, ALT 4, T Pro


                                                 5.6, Alb 2.9


     Nutritional Hx/Data


      Height                                     12.7 cm


      Height (Calculated Centimeters)            12.7


      Current Weight (lbs)                       63.503 kg


      Weight (Calculated Kilograms)              63.5


      Weight (Calculated Grams)                  67607.9


      Ideal Body Weight                          100 LB (45.45 kg)


      % Ideal Body Weight                        140


      Body Mass Index (BMI)                      3936.8


      Weight Status                              Overweight


     GI Symptoms


      GI Symptoms                                None


      Last BM                                    8/26 x2


      Skin Integrity/Comment:                    Skin: Rash, burning, itching,


                                                 dryness, redness, flaking,


                                                 bruises. Excoriation in


                                                 buttocks with dryness and


                                                 peeling


                                                 Chao: 14


      Current %PO                                Fair (50-74%)


     Estimated Nutritional Goals


      BEE in Kcals:                              Using Current wt


      Calories/Kcals/Kg                          9285-6764


      Kcals Calculated                           25-30


      Protein:                                   Using Current wt


      Protein g/k.0-1.2


      Protein Calculated                         64-76


      Fluid: ml                                  2153-4092 ml (25-30 ml/kg)


     Nutritional Problem


      1. Problem


       Problem                                   Altered nutrition related labs


       Etiology                                  r/t pathophysiological causes


       Signs/Symptoms:                           aeb : Hgb/Hct 11.4/33.6, Na


                                                 131, BUN/Cr 7/0.5, Glucose 204


                                                 , Ca 8.4, AST 8, ALT 4, T Pro


                                                 5.6, Alb 2.9


     Malnutrition Related to Morbid Obesity


      Malnutrition related to morbid obesity     No


     Intervention/Recommendation


      Comments                                   1. Continue with Cardiac,


                                                 Chopped, Ensure Enlive TID


                                                 diet as ordered.


                                                 2. Recommend d/c the Ensure


                                                 Enlive TID as PT PO intake is


                                                 adequate to estimated meet


                                                 nutritional needs.


     Expected Outcomes/Goals


      Expected Outcomes/Goals                    1. PO intake to meet 75% of


                                                 nutritional needs.


                                                 2. Monitor PO intake, wt,


                                                 nutrition related labs, and


                                                 skin integrity to trend WNL.


                                                 3. F/U as moderate risk in 3-5


                                                 days, -

## 2019-09-01 NOTE — PROGRESS NOTES
DATE:  09/01/2019



SUBJECTIVE:  The patient is currently in the hospital, AO to name only,

rambling, paranoid, telling me that staff may be trying to harm her, easily

agitated, not really making any sense, talking about nonsensical things,

disjointed, disoriented, very forgetful on exam, mostly keeps to self.  The

patient coming from [facility].  Seems to be generally calmer, but still

easily agitated, unpredictable at times.  Medications were noted.  Vitals were

reviewed.  We will continue to monitor closely.  The patient seems somewhat

calmer versus when she first got to the hospital.





DD: 09/01/2019 07:29

DT: 09/01/2019 19:59

JOB# [id number]  [id number]

## 2019-09-02 RX ADMIN — PANTOPRAZOLE SODIUM SCH MG: 40 TABLET, DELAYED RELEASE ORAL at 08:42

## 2019-09-02 NOTE — PROGRESS NOTES
DATE:  09/02/2019



SUBJECTIVE:  The patient remains in the hospital, slept for about 8 hours.  No

overt agitation, poor orientation, very confused, still not making any sense. 

When I talked to her, it is difficult to understand her thought processes.  She

is pretty disorganized, ongoing disorientation.  The patient unable to make

basic needs known.  She is telling staff that she believes that she is going to

die soon.



ASSESSMENT:  The patient fearful, still symptomatic, confused.  Per Social

Services note, the patient will be going to [facility].  The patient

seems to be in general approaching her baseline, but does remain easily

agitated, unpredictable, still with ongoing arguments with roommates, anger, but

somewhat calmer versus initial evaluation on 08/20/2019.





DD: 09/02/2019 11:12

DT: 09/02/2019 16:53

JOB# [id number]  [id number]

## 2019-09-02 NOTE — GENERAL PROGRESS NOTE
Subjective





- Review of Systems


Service Date: 19


Subjective: 





resting comfortably


no distress





Objective





- Results


Result Diagrams: 


 19 17:19





 19 17:19


Recent Labs: 


 Laboratory Last Values











WBC  6.4 Th/cmm (4.8-10.8)   19  17:19    


 


RBC  4.24 Mil/cmm (3.80-5.20)   19  17:19    


 


Hgb  11.4 gm/dL (12-16)  L  19  17:19    


 


Hct  33.6 % (41.0-60)  L  19  17:19    


 


MCV  79.1 fl ()  L  19  17:19    


 


MCH  26.8 pg (27.0-31.0)  L  19  17:19    


 


MCHC Differential  33.9 pg (28.0-36.0)   19  17:19    


 


RDW  16.0 % (11.5-20.0)   19  17:19    


 


Plt Count  466 Th/cmm (150-400)  H  19  17:19    


 


MPV  5.9 fl  19  17:19    


 


Neutrophils %  78.4 % (40.0-80.0)   19  17:19    


 


Lymphocytes %  11.1 % (20.0-50.0)  L  19  17:19    


 


Monocytes %  9.0 % (2.0-10.0)   19  17:19    


 


Eosinophils %  1.1 % (0.0-5.0)   19  17:19    


 


Basophils %  0.4 % (0.0-2.0)   19  17:19    


 


Sodium  131 mEq/L (136-145)  L  19  17:19    


 


Potassium  3.7 mEq/L (3.5-5.1)   19  17:19    


 


Chloride  97 mEq/L ()  L  19  17:19    


 


Carbon Dioxide  25.4 mEq/L (21.0-31.0)   19  17:19    


 


Anion Gap  12.3  (7.0-16.0)   19  17:19    


 


BUN  7 mg/dL (7-25)   19  17:19    


 


Creatinine  0.5 mg/dL (0.6-1.2)  L  19  17:19    


 


Est GFR ( Amer)  TNP   19  17:19    


 


Est GFR (Non-Af Amer)  TNP   19  17:19    


 


BUN/Creatinine Ratio  14.0   19  17:19    


 


Glucose  204 mg/dL ()  H  19  17:19    


 


Calcium  8.4 mg/dL (8.6-10.3)  L  19  17:19    


 


Total Bilirubin  0.3 mg/dL (0.3-1.0)   19  17:19    


 


AST  8 U/L (13-39)  L  19  17:19    


 


ALT  4 U/L (7-52)  L  19  17:19    


 


Alkaline Phosphatase  77 U/L ()   19  17:19    


 


Total Protein  5.6 gm/dL (6.0-8.3)  L  19  17:19    


 


Albumin  2.9 gm/dL (3.7-5.3)  L  19  17:19    


 


Globulin  2.7 gm/dL  19  17:19    


 


Albumin/Globulin Ratio  1.1  (1.0-1.8)   19  17:19    


 


Triglycerides  145 mg/dL (<150)   19  06:48    


 


Cholesterol  145 mg/dL (<200)   19  06:48    


 


LDL Cholesterol Direct  103 mg/dL ()   19  06:48    


 


HDL Cholesterol  27 mg/dL (23-92)   19  06:48    














- Physical Exam


Vitals and I&O: 


 Vital Signs











Temp  98.1 F   19 06:07


 


Pulse  117   19 08:38


 


Resp  20   19 08:00


 


BP  116/68   19 08:39


 


Pulse Ox  97   19 06:07








 Intake & Output











 19





 18:59 06:59 18:59


 


Intake Total 800 360 


 


Output Total  2 


 


Balance 800 358 


 


Intake:   


 


  Oral 800 360 


 


Output:   


 


  Urine/Stool Mix  2 


 


Other:   


 


  # Voids 3 1 


 


  # Bowel Movements 1  











Active Medications: 


Current Medications





Acetaminophen (Tylenol)  650 mg PO Q4HR PRN


   PRN Reason: Mild Pain / Temp above 100


   Stop: 10/21/19 21:31


   Last Admin: 19 20:38 Dose:  650 mg


Al Hydrox/Mg Hydrox/Simethicone (Maalox)  30 ml PO Q4HR PRN


   PRN Reason: GI DISTRESS


   Stop: 10/21/19 21:31


Carvedilol (Coreg)  3.125 mg PO BID ORLY


   Stop: 10/21/19 22:14


   Last Admin: 19 08:38 Dose:  3.125 mg


Furosemide (Lasix)  20 mg PO DAILY ORLY


   Stop: 10/22/19 08:59


   Last Admin: 19 08:39 Dose:  20 mg


Haloperidol Lactate (Haldol)  2 mg IM Q6HR PRN


   PRN Reason: Agitation


   Stop: 10/21/19 21:40


Levothyroxine Sodium 0.1 mg/ (Levothyroxine Sodium 0.075 mg)  0.175 mg PO QDAC 

ORLY


   Stop: 10/28/19 07:29


   Last Admin: 19 06:56 Dose:  0.175 mg


Lidocaine (Lidoderm 5% Patch)  1 patch TD DAILY ORLY


   Stop: 10/30/19 08:59


   Last Admin: 19 08:39 Dose:  1 patch


Lorazepam (Ativan)  0.5 mg PO Q4HR PRN; Protocol


   PRN Reason: Anxiety


   Stop: 19 21:31


   Last Admin: 19 09:25 Dose:  0.5 mg


Magnesium Hydroxide (Milk Of Magnesia)  30 ml PO HS PRN


   PRN Reason: Constipation


Megestrol Acetate (Megace)  400 mg PO BID ORLY; Protocol


   Stop: 10/25/19 08:59


   Last Admin: 19 08:42 Dose:  400 mg


Mirtazapine (Remeron)  15 mg PO HS ORLY; Protocol


   Stop: 10/22/19 20:59


   Last Admin: 19 20:40 Dose:  15 mg


Multivitamins/Vitamin C (Theragran)  1 tab PO DAILY ORLY


   Stop: 10/22/19 08:59


   Last Admin: 19 08:42 Dose:  1 tab


Nystatin (Nystop)  100 units TP DAILY PRN


   PRN Reason: redness to breast/abdom. folds


   Stop: 10/25/19 16:57


   Last Admin: 19 08:56 Dose:  100 units


Pantoprazole Sodium (Protonix)  40 mg PO DAILY ORLY


   Stop: 10/22/19 08:59


   Last Admin: 19 08:42 Dose:  40 mg


Pregabalin (Lyrica)  50 mg PO BID ORLY


   Stop: 10/22/19 08:59


   Last Admin: 19 08:42 Dose:  50 mg


Quetiapine Fumarate (Seroquel)  25 mg PO HS ORLY; Protocol


   Stop: 10/22/19 20:59


   Last Admin: 19 20:40 Dose:  25 mg


Tramadol HCl (Ultram)  50 mg PO Q6HR PRN


   PRN Reason: Pain (Severe)


   Stop: 10/22/19 18:41


   Last Admin: 19 11:42 Dose:  50 mg


Trazodone HCl (Desyrel)  50 mg PO HS ORLY; Protocol


   Stop: 10/22/19 20:59


   Last Admin: 19 20:40 Dose:  50 mg


Zolpidem Tartrate (Ambien)  5 mg PO HS PRN


   PRN Reason: Insomnia


   Stop: 10/21/19 21:31








General: No acute distress


HEENT: Atraumatic, PERRLA


Neck: Supple, JVD, Thyromegaly


Cardiovascular: Regular rate, Normal S1, Normal S2


Lungs: Clear to auscultation


Abdomen: Bowel sounds, Soft





Assessment/Plan





- Assessment


Assessment: 





1.HYPOTHYROIDISM.


2.OSEOARTHRITIS OF RT SHOULDER


3.MALNUTRITION.


4.SKIN LESION ON RT UPPER BACK .


5.RT SHOULDER PAIN.


6.PSYCHOSIS





- Plan


Plan: 





continue current treatment





Nutritional Asmnt/Malnutr-PDOC





- Dietary Evaluation


Malnutrition Findings (Please click <Entered> for more info): 








Nutritional Asmnt/Malnutrition                             Start:  19 11:

46


Text:                                                      Status: Complete    

  


Freq:                                                                          

  


Protocol:                                                                      

  


 Document     19 11:46  NICOLA  (Rec: 19 11:55  NICOLA [id number])


 Nutritional Asmnt/Malnutrition


     Patient General Information


      Nutritional Screening                      Moderate Risk


                                                 Consult


      Diagnosis                                  Psychosis NOS


      Pertinent Medical Hx/Surgical Hx           HTN, Diverticulosis, DJD,


                                                 hypothyroidism, GERD, Dementia


      Subjective Information                     Consult: low lab results


                                                 Pt is a 84-year-old female


                                                 admitted on 8/22 d/t


                                                 mechanical fall with ALOC. Pt


                                                 is eating 72% of meals x3 days


                                                 Per Meal/Nutrition Activity


                                                 Record. Pt currently receives


                                                 Ensure TID, d/t good PO intake


                                                 , the extra kcals and Pro are


                                                 unnecessary. Visited Pt at


                                                 bedside after breakfast time,


                                                 she was fast asleep- RN stated


                                                 she did not sleep well last


                                                 night. Recommended to RN,


                                                 Eleanor, to speak with MD


                                                 pertaining to the supplement


                                                 order.


                                                 HT: 5 FT


                                                 WT:140 LB (63.64 kg)


                                                 BMI: 27.37 (Overweight)


                                                 GI: Soft, Non-tender


                                                 BM: 8/26 x2


                                                 I/O: 120/Not Noted


                                                 Skin: Rash, burning, itching,


                                                 dryness, redness, flaking,


                                                 bruises. Excoriation in


                                                 buttocks with dryness and


                                                 peeling


                                                 Chao: 14


                                                 Diet Order: Cardiac, Chopped,


                                                 Ensure Enlive TID


                                                 Estimated Energy Needs: (


                                                 Geriatric, CBW)


                                                 2795-6468 kcals (25-30 kcals/


                                                 kg)


                                                 64-76g Pro (1.0-1.2 g/kg)


                                                 6129-0525 ml (25-30 ml/kg)


      Current Diet Order/ Nutrition Support      Cardiac, Chopped, Ensure


                                                 Enlive TID


      Pertinent Medications                      Maalox (PRN), Coreg, Lasix,


                                                 Synthroid, MOM (PRN), Megace,


                                                 Theragran, Protonix


      Pertinent Labs                             : Hgb/Hct 11.4/33.6, Na


                                                 131, BUN/Cr 7/0.5, Glucose 204


                                                 , Ca 8.4, AST 8, ALT 4, T Pro


                                                 5.6, Alb 2.9


     Nutritional Hx/Data


      Height                                     12.7 cm


      Height (Calculated Centimeters)            12.7


      Current Weight (lbs)                       63.503 kg


      Weight (Calculated Kilograms)              63.5


      Weight (Calculated Grams)                  45481.9


      Ideal Body Weight                          100 LB (45.45 kg)


      % Ideal Body Weight                        140


      Body Mass Index (BMI)                      3936.8


      Weight Status                              Overweight


     GI Symptoms


      GI Symptoms                                None


      Last BM                                    8/26 x2


      Skin Integrity/Comment:                    Skin: Rash, burning, itching,


                                                 dryness, redness, flaking,


                                                 bruises. Excoriation in


                                                 buttocks with dryness and


                                                 peeling


                                                 Chao: 14


      Current %PO                                Fair (50-74%)


     Estimated Nutritional Goals


      BEE in Kcals:                              Using Current wt


      Calories/Kcals/Kg                          0144-0474


      Kcals Calculated                           25-30


      Protein:                                   Using Current wt


      Protein g/k.0-1.2


      Protein Calculated                         64-76


      Fluid: ml                                  8457-3898 ml (25-30 ml/kg)


     Nutritional Problem


      1. Problem


       Problem                                   Altered nutrition related labs


       Etiology                                  r/t pathophysiological causes


       Signs/Symptoms:                           aeb : Hgb/Hct 11.4/33.6, Na


                                                 131, BUN/Cr 7/0.5, Glucose 204


                                                 , Ca 8.4, AST 8, ALT 4, T Pro


                                                 5.6, Alb 2.9


     Malnutrition Related to Morbid Obesity


      Malnutrition related to morbid obesity     No


     Intervention/Recommendation


      Comments                                   1. Continue with Cardiac,


                                                 Chopped, Ensure Enlive TID


                                                 diet as ordered.


                                                 2. Recommend d/c the Ensure


                                                 Enlive TID as PT PO intake is


                                                 adequate to estimated meet


                                                 nutritional needs.


     Expected Outcomes/Goals


      Expected Outcomes/Goals                    1. PO intake to meet 75% of


                                                 nutritional needs.


                                                 2. Monitor PO intake, wt,


                                                 nutrition related labs, and


                                                 skin integrity to trend WNL.


                                                 3. F/U as moderate risk in 3-5


                                                 days, -

## 2019-09-03 RX ADMIN — PANTOPRAZOLE SODIUM SCH MG: 40 TABLET, DELAYED RELEASE ORAL at 09:39

## 2019-09-03 NOTE — PROGRESS NOTES
DATE:  09/03/2019



SUBJECTIVE:  The patient is currently in the hospital, slept fairly well and no

behaviors, 8-1/2 hours of sleep.  No compliance, very confused, delusional, gets

easily agitated, highly unpredictable, irritable, mood changes really fast.  She

is pretty angry right now.  Observed to be confused, AO to name, place, not

situation, not month, not year, stating, "I am tired of these questions"

"these were all tests".  I know what disorganization is all about.  Does not

expand, resistant to care at times.



ASSESSMENT:  The patient remains symptomatic, disoriented, anxious, agitated,

angry, resistive to care.  Still making statements to staff that she feels that

she is going to die soon.



PLAN:  We will continue to monitor.  Continue dosing of trazodone and Seroquel.





DD: 09/03/2019 11:54

DT: 09/03/2019 22:03

JOB# [id number]  [id number]

## 2019-09-03 NOTE — INTERNAL MEDICINE PROG NOTE
Internal Medicine Subjective





- Subjective


Service Date: 19


Patient seen and examined:: without staff (CONFUISED)


Patient is:: verbal, in bed, confused


Per staff patient has:: no adverse event





Internal Medicine Objective





- Results


Result Diagrams: 


 19 17:19





 19 17:19


Recent Labs: 


 Laboratory Last Values











WBC  6.4 Th/cmm (4.8-10.8)   19  17:19    


 


RBC  4.24 Mil/cmm (3.80-5.20)   19  17:19    


 


Hgb  11.4 gm/dL (12-16)  L  19  17:19    


 


Hct  33.6 % (41.0-60)  L  19  17:19    


 


MCV  79.1 fl ()  L  19  17:19    


 


MCH  26.8 pg (27.0-31.0)  L  19  17:19    


 


MCHC Differential  33.9 pg (28.0-36.0)   19  17:19    


 


RDW  16.0 % (11.5-20.0)   19  17:19    


 


Plt Count  466 Th/cmm (150-400)  H  19  17:19    


 


MPV  5.9 fl  19  17:19    


 


Neutrophils %  78.4 % (40.0-80.0)   19  17:19    


 


Lymphocytes %  11.1 % (20.0-50.0)  L  19  17:19    


 


Monocytes %  9.0 % (2.0-10.0)   19  17:19    


 


Eosinophils %  1.1 % (0.0-5.0)   19  17:19    


 


Basophils %  0.4 % (0.0-2.0)   19  17:19    


 


Sodium  131 mEq/L (136-145)  L  19  17:19    


 


Potassium  3.7 mEq/L (3.5-5.1)   19  17:19    


 


Chloride  97 mEq/L ()  L  19  17:19    


 


Carbon Dioxide  25.4 mEq/L (21.0-31.0)   19  17:19    


 


Anion Gap  12.3  (7.0-16.0)   19  17:19    


 


BUN  7 mg/dL (7-25)   19  17:19    


 


Creatinine  0.5 mg/dL (0.6-1.2)  L  19  17:19    


 


Est GFR ( Amer)  TNP   19  17:19    


 


Est GFR (Non-Af Amer)  TNP   19  17:19    


 


BUN/Creatinine Ratio  14.0   19  17:19    


 


Glucose  204 mg/dL ()  H  19  17:19    


 


Calcium  8.4 mg/dL (8.6-10.3)  L  19  17:19    


 


Total Bilirubin  0.3 mg/dL (0.3-1.0)   19  17:19    


 


AST  8 U/L (13-39)  L  19  17:19    


 


ALT  4 U/L (7-52)  L  19  17:19    


 


Alkaline Phosphatase  77 U/L ()   19  17:19    


 


Total Protein  5.6 gm/dL (6.0-8.3)  L  19  17:19    


 


Albumin  2.9 gm/dL (3.7-5.3)  L  19  17:19    


 


Globulin  2.7 gm/dL  19  17:19    


 


Albumin/Globulin Ratio  1.1  (1.0-1.8)   19  17:19    


 


Triglycerides  145 mg/dL (<150)   19  06:48    


 


Cholesterol  145 mg/dL (<200)   19  06:48    


 


LDL Cholesterol Direct  103 mg/dL ()   19  06:48    


 


HDL Cholesterol  27 mg/dL (23-92)   19  06:48    














- Physical Exam


Vitals and I&O: 


 Vital Signs











Temp  97.4 F   19 20:14


 


Pulse  105   19 20:14


 


Resp  18   19 20:14


 


BP  103/54   19 20:14


 


Pulse Ox  92   19 20:14








 Intake & Output











 19





 06:59 18:59 06:59


 


Intake Total 240 900 120


 


Balance 240 900 120


 


Intake:   


 


  Oral 240 900 120


 


Other:   


 


  # Voids 2 3 3


 


  # Bowel Movements 1 1 0


 


  Stool Characteristics  Soft Soft





  Formed Formed





  Brown Brown











Active Medications: 


Current Medications





Acetaminophen (Tylenol)  650 mg PO Q4HR PRN


   PRN Reason: Mild Pain / Temp above 100


   Stop: 10/21/19 21:31


   Last Admin: 19 21:22 Dose:  650 mg


Al Hydrox/Mg Hydrox/Simethicone (Maalox)  30 ml PO Q4HR PRN


   PRN Reason: GI DISTRESS


   Stop: 10/21/19 21:31


Carvedilol (Coreg)  3.125 mg PO BID ORLY


   Stop: 10/21/19 22:14


   Last Admin: 19 16:46 Dose:  Not Given


Furosemide (Lasix)  20 mg PO DAILY ORLY


   Stop: 10/22/19 08:59


   Last Admin: 19 09:41 Dose:  20 mg


Haloperidol Lactate (Haldol)  2 mg IM Q6HR PRN


   PRN Reason: Agitation


   Stop: 10/21/19 21:40


Levothyroxine Sodium 0.1 mg/ (Levothyroxine Sodium 0.075 mg)  0.175 mg PO QDAC 

ORLY


   Stop: 10/28/19 07:29


   Last Admin: 19 06:50 Dose:  0.175 mg


Lidocaine (Lidoderm 5% Patch)  1 patch TD DAILY ORLY


   Stop: 10/30/19 08:59


   Last Admin: 19 09:41 Dose:  1 patch


Lorazepam (Ativan)  0.5 mg PO Q4HR PRN; Protocol


   PRN Reason: Anxiety


   Stop: 19 21:31


   Last Admin: 19 09:25 Dose:  0.5 mg


Magnesium Hydroxide (Milk Of Magnesia)  30 ml PO HS PRN


   PRN Reason: Constipation


Megestrol Acetate (Megace)  400 mg PO BID ORLY; Protocol


   Stop: 10/25/19 08:59


   Last Admin: 19 16:46 Dose:  400 mg


Mirtazapine (Remeron)  15 mg PO HS ORLY; Protocol


   Stop: 10/22/19 20:59


   Last Admin: 19 20:59 Dose:  15 mg


Multivitamins/Vitamin C (Theragran)  1 tab PO DAILY ORLY


   Stop: 10/22/19 08:59


   Last Admin: 19 09:39 Dose:  1 tab


Nystatin (Nystop)  100 units TP DAILY PRN


   PRN Reason: redness to breast/abdom. folds


   Stop: 10/25/19 16:57


   Last Admin: 19 08:56 Dose:  100 units


Pantoprazole Sodium (Protonix)  40 mg PO DAILY ORLY


   Stop: 10/22/19 08:59


   Last Admin: 19 09:39 Dose:  40 mg


Pregabalin (Lyrica)  50 mg PO BID ORLY


   Stop: 10/22/19 08:59


   Last Admin: 19 16:46 Dose:  50 mg


Quetiapine Fumarate (Seroquel)  25 mg PO HS ORLY; Protocol


   Stop: 10/22/19 20:59


   Last Admin: 19 20:59 Dose:  25 mg


Tramadol HCl (Ultram)  50 mg PO Q6HR PRN


   PRN Reason: Pain (Severe)


   Stop: 10/22/19 18:41


   Last Admin: 19 09:41 Dose:  50 mg


Trazodone HCl (Desyrel)  50 mg PO HS ORLY; Protocol


   Stop: 10/22/19 20:59


   Last Admin: 19 20:59 Dose:  50 mg


Zolpidem Tartrate (Ambien)  5 mg PO HS PRN


   PRN Reason: Insomnia


   Stop: 10/21/19 21:31








General: alert


HEENT: NC/AT, PERRLA, EOMI, anicteric sclerae, throat clear


Neck: Supple, No JVD, No thyromegaly, +2 carotid pulse wo bruit, No LAD


Cardiovascular: RRR, Normal S1, Normal S2, without murmur


Abdomen: soft, non-tender, non-distended


Extremities: clear


Neurological: no change





Internal Medicine Assmt/Plan





- Assessment


Assessment: 





1.HYPOTHYROIDISM.


2.OSEOARTHRITIS OF RT SHOULDER


3.MALNUTRITION.


4.SKIN LESION ON RT UPPER BACK .


5.RT SHOULDER PAIN.


6.PSYCHOSIS





- Plan


Plan: 





CONTINUE ON CURRENT MEDICATION AND DIET.





Nutritional Asmnt/Malnutr-PDOC





- Dietary Evaluation


Malnutrition Findings (Please click <Entered> for more info): 








Nutritional Asmnt/Malnutrition                             Start:  19 11:

46


Text:                                                      Status: Complete    

  


Freq:                                                                          

  


Protocol:                                                                      

  


 Document     19 11:46  NICOLA  (Rec: 19 11:55  NICOLA [id number])


 Nutritional Asmnt/Malnutrition


     Patient General Information


      Nutritional Screening                      Moderate Risk


                                                 Consult


      Diagnosis                                  Psychosis NOS


      Pertinent Medical Hx/Surgical Hx           HTN, Diverticulosis, DJD,


                                                 hypothyroidism, GERD, Dementia


      Subjective Information                     Consult: low lab results


                                                 Pt is a 84-year-old female


                                                 admitted on  d/t


                                                 mechanical fall with ALOC. Pt


                                                 is eating 72% of meals x3 days


                                                 Per Meal/Nutrition Activity


                                                 Record. Pt currently receives


                                                 Ensure TID, d/t good PO intake


                                                 , the extra kcals and Pro are


                                                 unnecessary. Visited Pt at


                                                 bedside after breakfast time,


                                                 she was fast asleep- RN stated


                                                 she did not sleep well last


                                                 night. Recommended to RN,


                                                 Eleanor, to speak with MD


                                                 pertaining to the supplement


                                                 order.


                                                 HT: 5 FT


                                                 WT:140 LB (63.64 kg)


                                                 BMI: 27.37 (Overweight)


                                                 GI: Soft, Non-tender


                                                 BM: 8/26 x2


                                                 I/O: 120/Not Noted


                                                 Skin: Rash, burning, itching,


                                                 dryness, redness, flaking,


                                                 bruises. Excoriation in


                                                 buttocks with dryness and


                                                 peeling


                                                 Chao: 14


                                                 Diet Order: Cardiac, Chopped,


                                                 Ensure Enlive TID


                                                 Estimated Energy Needs: (


                                                 Geriatric, CBW)


                                                 2782-5918 kcals (25-30 kcals/


                                                 kg)


                                                 64-76g Pro (1.0-1.2 g/kg)


                                                 5813-1478 ml (25-30 ml/kg)


      Current Diet Order/ Nutrition Support      Cardiac, Chopped, Ensure


                                                 Enlive TID


      Pertinent Medications                      Maalox (PRN), Coreg, Lasix,


                                                 Synthroid, MOM (PRN), Megace,


                                                 Theragran, Protonix


      Pertinent Labs                             : Hgb/Hct 11.4/33.6, Na


                                                 131, BUN/Cr 7/0.5, Glucose 204


                                                 , Ca 8.4, AST 8, ALT 4, T Pro


                                                 5.6, Alb 2.9


     Nutritional Hx/Data


      Height                                     12.7 cm


      Height (Calculated Centimeters)            12.7


      Current Weight (lbs)                       63.503 kg


      Weight (Calculated Kilograms)              63.5


      Weight (Calculated Grams)                  70752.9


      Ideal Body Weight                          100 LB (45.45 kg)


      % Ideal Body Weight                        140


      Body Mass Index (BMI)                      3936.8


      Weight Status                              Overweight


     GI Symptoms


      GI Symptoms                                None


      Last BM                                    8/26 x2


      Skin Integrity/Comment:                    Skin: Rash, burning, itching,


                                                 dryness, redness, flaking,


                                                 bruises. Excoriation in


                                                 buttocks with dryness and


                                                 peeling


                                                 Chao: 14


      Current %PO                                Fair (50-74%)


     Estimated Nutritional Goals


      BEE in Kcals:                              Using Current wt


      Calories/Kcals/Kg                          6240-3716


      Kcals Calculated                           25-30


      Protein:                                   Using Current wt


      Protein g/k.0-1.2


      Protein Calculated                         64-76


      Fluid: ml                                  8712-3369 ml (25-30 ml/kg)


     Nutritional Problem


      1. Problem


       Problem                                   Altered nutrition related labs


       Etiology                                  r/t pathophysiological causes


       Signs/Symptoms:                           aeb : Hgb/Hct 11.4/33.6, Na


                                                 131, BUN/Cr 7/0.5, Glucose 204


                                                 , Ca 8.4, AST 8, ALT 4, T Pro


                                                 5.6, Alb 2.9


     Malnutrition Related to Morbid Obesity


      Malnutrition related to morbid obesity     No


     Intervention/Recommendation


      Comments                                   1. Continue with Cardiac,


                                                 Chopped, Ensure Enlive TID


                                                 diet as ordered.


                                                 2. Recommend d/c the Ensure


                                                 Enlive TID as PT PO intake is


                                                 adequate to estimated meet


                                                 nutritional needs.


     Expected Outcomes/Goals


      Expected Outcomes/Goals                    1. PO intake to meet 75% of


                                                 nutritional needs.


                                                 2. Monitor PO intake, wt,


                                                 nutrition related labs, and


                                                 skin integrity to trend WNL.


                                                 3. F/U as moderate risk in 3-5


                                                 days, -

## 2019-09-04 RX ADMIN — PANTOPRAZOLE SODIUM SCH MG: 40 TABLET, DELAYED RELEASE ORAL at 09:14

## 2019-09-04 NOTE — PROGRESS NOTES
DATE:  09/04/2019



SUBJECTIVE:  The patient in the hospital, calm, more cooperative, likely at her

baseline.  No agitation, no hitting, no escalation of behaviors, sometimes

irritable, mostly confused.  Fair sleep; fair appetite; getting along well with

others, staff and peers; ongoing confusional state.  No SI, no HI, no psychotic

symptoms.  She has been accepted to a skilled nursing.  We will discharge.





DD: 09/04/2019 11:58

DT: 09/04/2019 23:02

[facility]# [id number]  [id number]

## 2019-09-04 NOTE — CONSULTATION
DATE OF CONSULTATION:  09/03/2019



SURGICAL CONSULTATION



TIME:  7:09 p.m.



HISTORY OF PRESENT ILLNESS:  The patient is an 84-year-old female with a long

history of hypothyroidism, degenerative joint disease, gastroesophageal reflux

disease, dementia, admitted to [facility] with mechanical

fall, altered level of consciousness seen by neurosurgeon, cardiologist,

psychiatrist, transferred to [facility] at [facility].  The patient was

found during this hospitalization to have a suspicious right upper back skin

lesion and surgical evaluation for possible biopsy.



PAST SURGICAL HISTORY:  The patient has no recent surgeries.



ALLERGIES:  IODINE and ADHESIVE TAPE.  



SOCIAL HISTORY:  No smoking, alcohol or recreational drug use.



FAMILY HISTORY:  Noncontributory.



REVIEW OF SYSTEMS:  She is demented and  is a poor historian, but she says that

she has had this right upper back skin lesion for several years now,

intermittently bleeds.  She has no other reported suspicious skin lesion.



PHYSICAL EXAMINATION:

VITAL SIGNS:  She is 63 kg, BMI 27.3.  She is afebrile.  Vital signs stable.  

GENERAL:  She is pleasantly demented.

HEENT AND NECK:  Otherwise within normal limits.  She has no neck mass or

lymphadenopathy.

CHEST:  Clear to auscultation bilaterally.

CARDIAC:  Regular rate.

SKIN:  Her right upper back, she has about a 15 x 10 mm suspicious skin lesion

involving the right upper back.  It is a slightly raised tannish flesh colored

lesion, irregular borders with some areas of bleeding.  There is no axillary or

neck lymphadenopathy.  There are no other suspicious skin lesions that are noted

throughout.



LABORATORY DATA:  Her white blood cell count 6.4, H and H 11 and 33.6, platelet

count 466.  Sodium is 131, chloride 97, creatinine 0.5, glucose 204, total

bilirubin 0.3.  AST and ALT are 8 and 4 respectively.



MEDICATIONS:  She is on Coreg, Lasix, Haldol, levothyroxine, Lidoderm 5% patch,

Megace, Protonix, Lyrica, Seroquel.  



X-RAYS:  No recent imaging test done.



IMPRESSION AND PLAN:  The patient is an 84-year-old female with multiple medical

problems including dementia, hypothyroidism, degenerative joint disease,

gastroesophageal reflux disease, originally had an admission for a mechanical

fall, altered mental status.  The patient transferred to [facility] for inpatient

following the patient was found with a suspicious right upper back skin lesion

measuring about 15 x 10 mm in size, irregular bordered slightly raised

suspicious for malignancy, possibly squamous cell carcinoma or a basal cell

carcinoma.  We will advise excisional biopsy of this lesion, but would likely

need to be done as an outpatient surgical procedure at either surgery center or

hospital, probably too large to do as a bedside procedure without the equipment

and cautery and suturing material.  We will talk with the admitting doctor and

nursing staff to see if they can arrange transfer to [facility] for outpatient surgical excision and possibly transfer back to the

[facility].  My office will work on the authorization for the

consent.  If the patient gets transferred to a skilled nursing facility, we will

follow up.





DD: 09/03/2019 19:15

DT: 09/04/2019 01:09

JOB# [id number]  [id number]

## 2019-09-04 NOTE — INTERNAL MEDICINE PROG NOTE
Internal Medicine Subjective





- Subjective


Service Date: 19


Patient seen and examined:: without staff (SHE  FEELS BETTER)


Patient is:: verbal, in bed, confused


Per staff patient has:: no adverse event





Internal Medicine Objective





- Results


Result Diagrams: 


 19 17:19





 19 17:19


Recent Labs: 


 Laboratory Last Values











WBC  6.4 Th/cmm (4.8-10.8)   19  17:19    


 


RBC  4.24 Mil/cmm (3.80-5.20)   19  17:19    


 


Hgb  11.4 gm/dL (12-16)  L  19  17:19    


 


Hct  33.6 % (41.0-60)  L  19  17:19    


 


MCV  79.1 fl ()  L  19  17:19    


 


MCH  26.8 pg (27.0-31.0)  L  19  17:19    


 


MCHC Differential  33.9 pg (28.0-36.0)   19  17:19    


 


RDW  16.0 % (11.5-20.0)   19  17:19    


 


Plt Count  466 Th/cmm (150-400)  H  19  17:19    


 


MPV  5.9 fl  19  17:19    


 


Neutrophils %  78.4 % (40.0-80.0)   19  17:19    


 


Lymphocytes %  11.1 % (20.0-50.0)  L  19  17:19    


 


Monocytes %  9.0 % (2.0-10.0)   19  17:19    


 


Eosinophils %  1.1 % (0.0-5.0)   19  17:19    


 


Basophils %  0.4 % (0.0-2.0)   19  17:19    


 


Sodium  131 mEq/L (136-145)  L  19  17:19    


 


Potassium  3.7 mEq/L (3.5-5.1)   19  17:19    


 


Chloride  97 mEq/L ()  L  19  17:19    


 


Carbon Dioxide  25.4 mEq/L (21.0-31.0)   19  17:19    


 


Anion Gap  12.3  (7.0-16.0)   19  17:19    


 


BUN  7 mg/dL (7-25)   19  17:19    


 


Creatinine  0.5 mg/dL (0.6-1.2)  L  19  17:19    


 


Est GFR ( Amer)  TNP   19  17:19    


 


Est GFR (Non-Af Amer)  TNP   19  17:19    


 


BUN/Creatinine Ratio  14.0   19  17:19    


 


Glucose  204 mg/dL ()  H  19  17:19    


 


Calcium  8.4 mg/dL (8.6-10.3)  L  19  17:19    


 


Total Bilirubin  0.3 mg/dL (0.3-1.0)   19  17:19    


 


AST  8 U/L (13-39)  L  19  17:19    


 


ALT  4 U/L (7-52)  L  19  17:19    


 


Alkaline Phosphatase  77 U/L ()   19  17:19    


 


Total Protein  5.6 gm/dL (6.0-8.3)  L  19  17:19    


 


Albumin  2.9 gm/dL (3.7-5.3)  L  19  17:19    


 


Globulin  2.7 gm/dL  19  17:19    


 


Albumin/Globulin Ratio  1.1  (1.0-1.8)   19  17:19    


 


Triglycerides  145 mg/dL (<150)   19  06:48    


 


Cholesterol  145 mg/dL (<200)   19  06:48    


 


LDL Cholesterol Direct  103 mg/dL ()   19  06:48    


 


HDL Cholesterol  27 mg/dL (23-92)   19  06:48    














- Physical Exam


Vitals and I&O: 


 Vital Signs











Temp  98.7 F   19 20:36


 


Pulse  109   19 20:36


 


Resp  18   19 20:36


 


BP  109/58   19 20:36


 


Pulse Ox  95   19 20:36








 Intake & Output











 19





 06:59 18:59 06:59


 


Intake Total 120 1200 120


 


Balance 120 1200 120


 


Intake:   


 


  Oral 120 1200 120


 


Other:   


 


  # Voids 3  2


 


  # Bowel Movements 0 1 0


 


  Stool Characteristics Soft  





 Formed  





 Brown  











Active Medications: 


Current Medications





Acetaminophen (Tylenol)  650 mg PO Q4HR PRN


   PRN Reason: Mild Pain / Temp above 100


   Stop: 10/21/19 21:31


   Last Admin: 19 21:22 Dose:  650 mg


Al Hydrox/Mg Hydrox/Simethicone (Maalox)  30 ml PO Q4HR PRN


   PRN Reason: GI DISTRESS


   Stop: 10/21/19 21:31


Carvedilol (Coreg)  3.125 mg PO BID ORLY


   Stop: 10/21/19 22:14


   Last Admin: 19 16:16 Dose:  Not Given


Furosemide (Lasix)  20 mg PO DAILY ORLY


   Stop: 10/22/19 08:59


   Last Admin: 19 09:15 Dose:  20 mg


Haloperidol Lactate (Haldol)  2 mg IM Q6HR PRN


   PRN Reason: Agitation


   Stop: 10/21/19 21:40


Levothyroxine Sodium 0.1 mg/ (Levothyroxine Sodium 0.075 mg)  0.175 mg PO QDAC 

ORLY


   Stop: 10/28/19 07:29


   Last Admin: 19 06:41 Dose:  0.175 mg


Lidocaine (Lidoderm 5% Patch)  1 patch TD DAILY ORLY


   Stop: 10/30/19 08:59


   Last Admin: 19 09:11 Dose:  1 patch


Lorazepam (Ativan)  0.5 mg PO Q4HR PRN; Protocol


   PRN Reason: Anxiety


   Stop: 19 21:31


   Last Admin: 19 09:25 Dose:  0.5 mg


Magnesium Hydroxide (Milk Of Magnesia)  30 ml PO HS PRN


   PRN Reason: Constipation


Megestrol Acetate (Megace)  400 mg PO BID ORLY; Protocol


   Stop: 10/25/19 08:59


   Last Admin: 19 16:17 Dose:  Not Given


Mirtazapine (Remeron)  15 mg PO HS ORLY; Protocol


   Stop: 10/22/19 20:59


   Last Admin: 19 20:59 Dose:  15 mg


Multivitamins/Vitamin C (Theragran)  1 tab PO DAILY ORLY


   Stop: 10/22/19 08:59


   Last Admin: 19 09:14 Dose:  1 tab


Nystatin (Nystop)  100 units TP DAILY PRN


   PRN Reason: redness to breast/abdom. folds


   Stop: 10/25/19 16:57


   Last Admin: 19 08:56 Dose:  100 units


Pantoprazole Sodium (Protonix)  40 mg PO DAILY ORLY


   Stop: 10/22/19 08:59


   Last Admin: 19 09:14 Dose:  40 mg


Pregabalin (Lyrica)  50 mg PO BID ORLY


   Stop: 10/22/19 08:59


   Last Admin: 19 16:19 Dose:  50 mg


Quetiapine Fumarate (Seroquel)  25 mg PO HS ORLY; Protocol


   Stop: 10/22/19 20:59


   Last Admin: 19 20:59 Dose:  25 mg


Tramadol HCl (Ultram)  50 mg PO Q6HR PRN


   PRN Reason: Pain (Severe)


   Stop: 10/22/19 18:41


   Last Admin: 19 09:41 Dose:  50 mg


Trazodone HCl (Desyrel)  50 mg PO HS ORLY; Protocol


   Stop: 10/22/19 20:59


   Last Admin: 19 20:59 Dose:  50 mg


Zolpidem Tartrate (Ambien)  5 mg PO HS PRN


   PRN Reason: Insomnia


   Stop: 10/21/19 21:31








General: alert


HEENT: NC/AT, PERRLA, EOMI, anicteric sclerae, throat clear


Neck: Supple, No JVD, No thyromegaly, +2 carotid pulse wo bruit, No LAD


Cardiovascular: RRR, Normal S1, Normal S2, without murmur


Abdomen: soft, non-tender, non-distended


Extremities: clear


Neurological: no change





Internal Medicine Assmt/Plan





- Assessment


Assessment: 





1.HYPOTHYROIDISM.


2.OSEOARTHRITIS OF RT SHOULDER


3.MALNUTRITION.


4.SKIN LESION ON RT UPPER BACK .


5.RT SHOULDER PAIN.


6.PSYCHOSIS





- Plan


Plan: 





CONTINUE ON CURRENT MEDICATION AND DIET.





Nutritional Asmnt/Malnutr-PDOC





- Dietary Evaluation


Malnutrition Findings (Please click <Entered> for more info): 








Nutritional Asmnt/Malnutrition                             Start:  19 11:

46


Text:                                                      Status: Complete    

  


Freq:                                                                          

  


Protocol:                                                                      

  


 Document     19 11:46  NICOLA  (Rec: 19 11:55  NICOLA [id number])


 Nutritional Asmnt/Malnutrition


     Patient General Information


      Nutritional Screening                      Moderate Risk


                                                 Consult


      Diagnosis                                  Psychosis NOS


      Pertinent Medical Hx/Surgical Hx           HTN, Diverticulosis, DJD,


                                                 hypothyroidism, GERD, Dementia


      Subjective Information                     Consult: low lab results


                                                 Pt is a 84-year-old female


                                                 admitted on  d/t


                                                 mechanical fall with ALOC. Pt


                                                 is eating 72% of meals x3 days


                                                 Per Meal/Nutrition Activity


                                                 Record. Pt currently receives


                                                 Ensure TID, d/t good PO intake


                                                 , the extra kcals and Pro are


                                                 unnecessary. Visited Pt at


                                                 bedside after breakfast time,


                                                 she was fast asleep- RN stated


                                                 she did not sleep well last


                                                 night. Recommended to RN,


                                                 Eleanor, to speak with MD


                                                 pertaining to the supplement


                                                 order.


                                                 HT: 5 FT


                                                 WT:140 LB (63.64 kg)


                                                 BMI: 27.37 (Overweight)


                                                 GI: Soft, Non-tender


                                                 BM: 8/26 x2


                                                 I/O: 120/Not Noted


                                                 Skin: Rash, burning, itching,


                                                 dryness, redness, flaking,


                                                 bruises. Excoriation in


                                                 buttocks with dryness and


                                                 peeling


                                                 Chao: 14


                                                 Diet Order: Cardiac, Chopped,


                                                 Ensure Enlive TID


                                                 Estimated Energy Needs: (


                                                 Geriatric, CBW)


                                                 0391-5916 kcals (25-30 kcals/


                                                 kg)


                                                 64-76g Pro (1.0-1.2 g/kg)


                                                 4377-5719 ml (25-30 ml/kg)


      Current Diet Order/ Nutrition Support      Cardiac, Chopped, Ensure


                                                 Enlive TID


      Pertinent Medications                      Maalox (PRN), Coreg, Lasix,


                                                 Synthroid, MOM (PRN), Megace,


                                                 Theragran, Protonix


      Pertinent Labs                             : Hgb/Hct 11.4/33.6, Na


                                                 131, BUN/Cr 7/0.5, Glucose 204


                                                 , Ca 8.4, AST 8, ALT 4, T Pro


                                                 5.6, Alb 2.9


     Nutritional Hx/Data


      Height                                     12.7 cm


      Height (Calculated Centimeters)            12.7


      Current Weight (lbs)                       63.503 kg


      Weight (Calculated Kilograms)              63.5


      Weight (Calculated Grams)                  96446.9


      Ideal Body Weight                          100 LB (45.45 kg)


      % Ideal Body Weight                        140


      Body Mass Index (BMI)                      3936.8


      Weight Status                              Overweight


     GI Symptoms


      GI Symptoms                                None


      Last BM                                    8/26 x2


      Skin Integrity/Comment:                    Skin: Rash, burning, itching,


                                                 dryness, redness, flaking,


                                                 bruises. Excoriation in


                                                 buttocks with dryness and


                                                 peeling


                                                 Chao: 14


      Current %PO                                Fair (50-74%)


     Estimated Nutritional Goals


      BEE in Kcals:                              Using Current wt


      Calories/Kcals/Kg                          0071-9603


      Kcals Calculated                           25-30


      Protein:                                   Using Current wt


      Protein g/k.0-1.2


      Protein Calculated                         64-76


      Fluid: ml                                  8154-8329 ml (25-30 ml/kg)


     Nutritional Problem


      1. Problem


       Problem                                   Altered nutrition related labs


       Etiology                                  r/t pathophysiological causes


       Signs/Symptoms:                           aeb : Hgb/Hct 11.4/33.6, Na


                                                 131, BUN/Cr 7/0.5, Glucose 204


                                                 , Ca 8.4, AST 8, ALT 4, T Pro


                                                 5.6, Alb 2.9


     Malnutrition Related to Morbid Obesity


      Malnutrition related to morbid obesity     No


     Intervention/Recommendation


      Comments                                   1. Continue with Cardiac,


                                                 Chopped, Ensure Enlive TID


                                                 diet as ordered.


                                                 2. Recommend d/c the Ensure


                                                 Enlive TID as PT PO intake is


                                                 adequate to estimated meet


                                                 nutritional needs.


     Expected Outcomes/Goals


      Expected Outcomes/Goals                    1. PO intake to meet 75% of


                                                 nutritional needs.


                                                 2. Monitor PO intake, wt,


                                                 nutrition related labs, and


                                                 skin integrity to trend WNL.


                                                 3. F/U as moderate risk in 3-5


                                                 days, -

## 2019-09-05 RX ADMIN — PANTOPRAZOLE SODIUM SCH MG: 40 TABLET, DELAYED RELEASE ORAL at 09:45

## 2019-09-05 NOTE — PROGRESS NOTES
DATE:  09/05/2019



SUBJECTIVE:  The patient in the hospital, calm, likely at her baseline.  We are

pending a transfer to a skilled nursing.  She seems to be at her baseline,

pleasant this morning.  Mood "okay," but sometimes unpredictable in regards to

her mood.  We will continue to monitor.  We are trying to get her to a skilled

nursing, pending confirmation from daughter.  No SI, no HI.  No agitation.





DD: 09/05/2019 10:25

DT: 09/05/2019 21:08

[facility]# [id number]  [id number]

## 2019-09-05 NOTE — INTERNAL MEDICINE PROG NOTE
Internal Medicine Subjective





- Subjective


Service Date: 19


Patient seen and examined:: without staff (SHE FEELS BETTER,LESS PAIN.)


Patient is:: verbal, in bed, confused


Per staff patient has:: no adverse event





Internal Medicine Objective





- Results


Result Diagrams: 


 19 17:19





 19 17:19


Recent Labs: 


 Laboratory Last Values











WBC  6.4 Th/cmm (4.8-10.8)   19  17:19    


 


RBC  4.24 Mil/cmm (3.80-5.20)   19  17:19    


 


Hgb  11.4 gm/dL (12-16)  L  19  17:19    


 


Hct  33.6 % (41.0-60)  L  19  17:19    


 


MCV  79.1 fl ()  L  19  17:19    


 


MCH  26.8 pg (27.0-31.0)  L  19  17:19    


 


MCHC Differential  33.9 pg (28.0-36.0)   19  17:19    


 


RDW  16.0 % (11.5-20.0)   19  17:19    


 


Plt Count  466 Th/cmm (150-400)  H  19  17:19    


 


MPV  5.9 fl  19  17:19    


 


Neutrophils %  78.4 % (40.0-80.0)   19  17:19    


 


Lymphocytes %  11.1 % (20.0-50.0)  L  19  17:19    


 


Monocytes %  9.0 % (2.0-10.0)   19  17:19    


 


Eosinophils %  1.1 % (0.0-5.0)   19  17:19    


 


Basophils %  0.4 % (0.0-2.0)   19  17:19    


 


Sodium  131 mEq/L (136-145)  L  19  17:19    


 


Potassium  3.7 mEq/L (3.5-5.1)   19  17:19    


 


Chloride  97 mEq/L ()  L  19  17:19    


 


Carbon Dioxide  25.4 mEq/L (21.0-31.0)   19  17:19    


 


Anion Gap  12.3  (7.0-16.0)   19  17:19    


 


BUN  7 mg/dL (7-25)   19  17:19    


 


Creatinine  0.5 mg/dL (0.6-1.2)  L  19  17:19    


 


Est GFR ( Amer)  TNP   19  17:19    


 


Est GFR (Non-Af Amer)  TNP   19  17:19    


 


BUN/Creatinine Ratio  14.0   19  17:19    


 


Glucose  204 mg/dL ()  H  19  17:19    


 


Calcium  8.4 mg/dL (8.6-10.3)  L  19  17:19    


 


Total Bilirubin  0.3 mg/dL (0.3-1.0)   19  17:19    


 


AST  8 U/L (13-39)  L  19  17:19    


 


ALT  4 U/L (7-52)  L  19  17:19    


 


Alkaline Phosphatase  77 U/L ()   19  17:19    


 


Total Protein  5.6 gm/dL (6.0-8.3)  L  19  17:19    


 


Albumin  2.9 gm/dL (3.7-5.3)  L  19  17:19    


 


Globulin  2.7 gm/dL  19  17:19    


 


Albumin/Globulin Ratio  1.1  (1.0-1.8)   19  17:19    


 


Triglycerides  145 mg/dL (<150)   19  06:48    


 


Cholesterol  145 mg/dL (<200)   19  06:48    


 


LDL Cholesterol Direct  103 mg/dL ()   19  06:48    


 


HDL Cholesterol  27 mg/dL (23-92)   19  06:48    














- Physical Exam


Vitals and I&O: 


 Vital Signs











Temp  98.2 F   19 14:00


 


Pulse  93   19 16:40


 


Resp  20   19 15:56


 


BP  98/60   19 16:40


 


Pulse Ox  96   19 14:00








 Intake & Output











 19





 18:59 06:59 18:59


 


Intake Total 1200 240 960


 


Balance 1200 240 960


 


Intake:   


 


  Oral 1200 240 960


 


Other:   


 


  # Voids  2 4


 


  # Bowel Movements 1 1 1


 


  Stool Characteristics   Soft





   Formed





   Brown











Active Medications: 


Current Medications





Acetaminophen (Tylenol)  650 mg PO Q4HR PRN


   PRN Reason: Mild Pain / Temp above 100


   Stop: 10/21/19 21:31


   Last Admin: 19 21:22 Dose:  650 mg


Al Hydrox/Mg Hydrox/Simethicone (Maalox)  30 ml PO Q4HR PRN


   PRN Reason: GI DISTRESS


   Stop: 10/21/19 21:31


Carvedilol (Coreg)  3.125 mg PO BID ORLY


   Stop: 10/21/19 22:14


   Last Admin: 19 16:40 Dose:  Not Given


Furosemide (Lasix)  20 mg PO DAILY ORLY


   Stop: 10/22/19 08:59


   Last Admin: 19 09:46 Dose:  20 mg


Haloperidol Lactate (Haldol)  2 mg IM Q6HR PRN


   PRN Reason: Agitation


   Stop: 10/21/19 21:40


Levothyroxine Sodium 0.1 mg/ (Levothyroxine Sodium 0.075 mg)  0.175 mg PO QDAC 

ORLY


   Stop: 10/28/19 07:29


   Last Admin: 19 06:48 Dose:  0.175 mg


Lidocaine (Lidoderm 5% Patch)  1 patch TD DAILY ORLY


   Stop: 10/30/19 08:59


   Last Admin: 19 09:45 Dose:  1 patch


Lorazepam (Ativan)  0.5 mg PO Q4HR PRN; Protocol


   PRN Reason: Anxiety


   Stop: 19 21:31


   Last Admin: 19 09:25 Dose:  0.5 mg


Magnesium Hydroxide (Milk Of Magnesia)  30 ml PO HS PRN


   PRN Reason: Constipation


Megestrol Acetate (Megace)  400 mg PO BID ORLY; Protocol


   Stop: 10/25/19 08:59


   Last Admin: 19 17:28 Dose:  400 mg


Mirtazapine (Remeron)  15 mg PO HS ORLY; Protocol


   Stop: 10/22/19 20:59


   Last Admin: 19 21:24 Dose:  15 mg


Multivitamins/Vitamin C (Theragran)  1 tab PO DAILY ORLY


   Stop: 10/22/19 08:59


   Last Admin: 19 09:45 Dose:  1 tab


Nystatin (Nystop)  100 units TP DAILY PRN


   PRN Reason: redness to breast/abdom. folds


   Stop: 10/25/19 16:57


   Last Admin: 19 08:56 Dose:  100 units


Pantoprazole Sodium (Protonix)  40 mg PO DAILY ORLY


   Stop: 10/22/19 08:59


   Last Admin: 19 09:45 Dose:  40 mg


Pregabalin (Lyrica)  50 mg PO BID ORLY


   Stop: 10/22/19 08:59


   Last Admin: 19 17:28 Dose:  50 mg


Quetiapine Fumarate (Seroquel)  25 mg PO HS ORLY; Protocol


   Stop: 10/22/19 20:59


   Last Admin: 19 21:24 Dose:  25 mg


Tramadol HCl (Ultram)  50 mg PO Q6HR PRN


   PRN Reason: Pain (Severe)


   Stop: 10/22/19 18:41


   Last Admin: 19 09:41 Dose:  50 mg


Trazodone HCl (Desyrel)  50 mg PO HS ORLY; Protocol


   Stop: 10/22/19 20:59


   Last Admin: 19 21:24 Dose:  50 mg


Zolpidem Tartrate (Ambien)  5 mg PO HS PRN


   PRN Reason: Insomnia


   Stop: 10/21/19 21:31








General: alert


HEENT: NC/AT, PERRLA, EOMI, anicteric sclerae, throat clear


Neck: Supple, No JVD, No thyromegaly, +2 carotid pulse wo bruit, No LAD


Cardiovascular: RRR, Normal S1, Normal S2, without murmur


Abdomen: soft, non-tender, non-distended


Extremities: clear


Neurological: no change





Internal Medicine Assmt/Plan





- Assessment


Assessment: 





1.HYPOTHYROIDISM.


2.OSEOARTHRITIS OF RT SHOULDER


3.MALNUTRITION.


4.SKIN LESION ON RT UPPER BACK .


5.RT SHOULDER PAIN.


6.PSYCHOSIS





- Plan


Plan: 





CONTINUE ON CURRENT MEDICATION AND DIET.





Nutritional Asmnt/Malnutr-PDOC





- Dietary Evaluation


Malnutrition Findings (Please click <Entered> for more info): 








Nutritional Asmnt/Malnutrition                             Start:  19 11:

46


Text:                                                      Status: Complete    

  


Freq:                                                                          

  


Protocol:                                                                      

  


 Document     19 11:46  JEXAMUS  (Rec: 19 11:55  NICOLA  [id number])


 Nutritional Asmnt/Malnutrition


     Patient General Information


      Nutritional Screening                      Moderate Risk


                                                 Consult


      Diagnosis                                  Psychosis NOS


      Pertinent Medical Hx/Surgical Hx           HTN, Diverticulosis, DJD,


                                                 hypothyroidism, GERD, Dementia


      Subjective Information                     Consult: low lab results


                                                 Pt is a 84-year-old female


                                                 admitted on  d/t


                                                 mechanical fall with ALOC. Pt


                                                 is eating 72% of meals x3 days


                                                 Per Meal/Nutrition Activity


                                                 Record. Pt currently receives


                                                 Ensure TID, d/t good PO intake


                                                 , the extra kcals and Pro are


                                                 unnecessary. Visited Pt at


                                                 bedside after breakfast time,


                                                 she was fast asleep- RN stated


                                                 she did not sleep well last


                                                 night. Recommended to RN,


                                                 Eleanor, to speak with MD


                                                 pertaining to the supplement


                                                 order.


                                                 HT: 5 FT


                                                 WT:140 LB (63.64 kg)


                                                 BMI: 27.37 (Overweight)


                                                 GI: Soft, Non-tender


                                                 BM: 8/26 x2


                                                 I/O: 120/Not Noted


                                                 Skin: Rash, burning, itching,


                                                 dryness, redness, flaking,


                                                 bruises. Excoriation in


                                                 buttocks with dryness and


                                                 peeling


                                                 Chao: 14


                                                 Diet Order: Cardiac, Chopped,


                                                 Ensure Enlive TID


                                                 Estimated Energy Needs: (


                                                 Geriatric, CBW)


                                                 0910-8980 kcals (25-30 kcals/


                                                 kg)


                                                 64-76g Pro (1.0-1.2 g/kg)


                                                 0811-2212 ml (25-30 ml/kg)


      Current Diet Order/ Nutrition Support      Cardiac, Chopped, Ensure


                                                 Enlive TID


      Pertinent Medications                      Maalox (PRN), Coreg, Lasix,


                                                 Synthroid, MOM (PRN), Megace,


                                                 Theragran, Protonix


      Pertinent Labs                             : Hgb/Hct 11.4/33.6, Na


                                                 131, BUN/Cr 7/0.5, Glucose 204


                                                 , Ca 8.4, AST 8, ALT 4, T Pro


                                                 5.6, Alb 2.9


     Nutritional Hx/Data


      Height                                     12.7 cm


      Height (Calculated Centimeters)            12.7


      Current Weight (lbs)                       63.503 kg


      Weight (Calculated Kilograms)              63.5


      Weight (Calculated Grams)                  20166.9


      Ideal Body Weight                          100 LB (45.45 kg)


      % Ideal Body Weight                        140


      Body Mass Index (BMI)                      3936.8


      Weight Status                              Overweight


     GI Symptoms


      GI Symptoms                                None


      Last BM                                    8/26 x2


      Skin Integrity/Comment:                    Skin: Rash, burning, itching,


                                                 dryness, redness, flaking,


                                                 bruises. Excoriation in


                                                 buttocks with dryness and


                                                 peeling


                                                 Chao: 14


      Current %PO                                Fair (50-74%)


     Estimated Nutritional Goals


      BEE in Kcals:                              Using Current wt


      Calories/Kcals/Kg                          9767-3602


      Kcals Calculated                           25-30


      Protein:                                   Using Current wt


      Protein g/k.0-1.2


      Protein Calculated                         64-76


      Fluid: ml                                  0878-1764 ml (25-30 ml/kg)


     Nutritional Problem


      1. Problem


       Problem                                   Altered nutrition related labs


       Etiology                                  r/t pathophysiological causes


       Signs/Symptoms:                           aeb : Hgb/Hct 11.4/33.6, Na


                                                 131, BUN/Cr 7/0.5, Glucose 204


                                                 , Ca 8.4, AST 8, ALT 4, T Pro


                                                 5.6, Alb 2.9


     Malnutrition Related to Morbid Obesity


      Malnutrition related to morbid obesity     No


     Intervention/Recommendation


      Comments                                   1. Continue with Cardiac,


                                                 Chopped, Ensure Enlive TID


                                                 diet as ordered.


                                                 2. Recommend d/c the Ensure


                                                 Enlive TID as PT PO intake is


                                                 adequate to estimated meet


                                                 nutritional needs.


     Expected Outcomes/Goals


      Expected Outcomes/Goals                    1. PO intake to meet 75% of


                                                 nutritional needs.


                                                 2. Monitor PO intake, wt,


                                                 nutrition related labs, and


                                                 skin integrity to trend WNL.


                                                 3. F/U as moderate risk in 3-5


                                                 days, -

## 2019-09-06 RX ADMIN — PANTOPRAZOLE SODIUM SCH MG: 40 TABLET, DELAYED RELEASE ORAL at 09:47

## 2019-09-06 NOTE — INTERNAL MEDICINE PROG NOTE
Internal Medicine Subjective





- Subjective


Service Date: 19


Patient seen and examined:: without staff (SHE FEELS BETTER)


Patient is:: verbal, in bed, confused


Per staff patient has:: no adverse event





Internal Medicine Objective





- Results


Result Diagrams: 


 19 17:19





 19 17:19


Recent Labs: 


 Laboratory Last Values











WBC  6.4 Th/cmm (4.8-10.8)   19  17:19    


 


RBC  4.24 Mil/cmm (3.80-5.20)   19  17:19    


 


Hgb  11.4 gm/dL (12-16)  L  19  17:19    


 


Hct  33.6 % (41.0-60)  L  19  17:19    


 


MCV  79.1 fl ()  L  19  17:19    


 


MCH  26.8 pg (27.0-31.0)  L  19  17:19    


 


MCHC Differential  33.9 pg (28.0-36.0)   19  17:19    


 


RDW  16.0 % (11.5-20.0)   19  17:19    


 


Plt Count  466 Th/cmm (150-400)  H  19  17:19    


 


MPV  5.9 fl  19  17:19    


 


Neutrophils %  78.4 % (40.0-80.0)   19  17:19    


 


Lymphocytes %  11.1 % (20.0-50.0)  L  19  17:19    


 


Monocytes %  9.0 % (2.0-10.0)   19  17:19    


 


Eosinophils %  1.1 % (0.0-5.0)   19  17:19    


 


Basophils %  0.4 % (0.0-2.0)   19  17:19    


 


Sodium  131 mEq/L (136-145)  L  19  17:19    


 


Potassium  3.7 mEq/L (3.5-5.1)   19  17:19    


 


Chloride  97 mEq/L ()  L  19  17:19    


 


Carbon Dioxide  25.4 mEq/L (21.0-31.0)   19  17:19    


 


Anion Gap  12.3  (7.0-16.0)   19  17:19    


 


BUN  7 mg/dL (7-25)   19  17:19    


 


Creatinine  0.5 mg/dL (0.6-1.2)  L  19  17:19    


 


Est GFR ( Amer)  TNP   19  17:19    


 


Est GFR (Non-Af Amer)  TNP   19  17:19    


 


BUN/Creatinine Ratio  14.0   19  17:19    


 


Glucose  204 mg/dL ()  H  19  17:19    


 


Calcium  8.4 mg/dL (8.6-10.3)  L  19  17:19    


 


Total Bilirubin  0.3 mg/dL (0.3-1.0)   19  17:19    


 


AST  8 U/L (13-39)  L  19  17:19    


 


ALT  4 U/L (7-52)  L  19  17:19    


 


Alkaline Phosphatase  77 U/L ()   19  17:19    


 


Total Protein  5.6 gm/dL (6.0-8.3)  L  19  17:19    


 


Albumin  2.9 gm/dL (3.7-5.3)  L  19  17:19    


 


Globulin  2.7 gm/dL  19  17:19    


 


Albumin/Globulin Ratio  1.1  (1.0-1.8)   19  17:19    


 


Triglycerides  145 mg/dL (<150)   19  06:48    


 


Cholesterol  145 mg/dL (<200)   19  06:48    


 


LDL Cholesterol Direct  103 mg/dL ()   19  06:48    


 


HDL Cholesterol  27 mg/dL (23-92)   19  06:48    














- Physical Exam


Vitals and I&O: 


 Vital Signs











Temp  98.2 F   19 20:20


 


Pulse  98   19 20:20


 


Resp  19   19 20:20


 


BP  100/63   19 20:20


 


Pulse Ox  96   19 20:20








 Intake & Output











 19





 06:59 18:59 06:59


 


Intake Total  1300 240


 


Output Total   1


 


Balance  1300 239


 


Intake:   


 


  Oral  1300 240


 


Output:   


 


  Urine/Stool Mix   1


 


Other:   


 


  # Voids  3 1


 


  # Bowel Movements  0 


 


  Stool Characteristics  Soft 





  Formed 





  Brown 











Active Medications: 


Current Medications





Acetaminophen (Tylenol)  650 mg PO Q4HR PRN


   PRN Reason: Mild Pain / Temp above 100


   Stop: 10/21/19 21:31


   Last Admin: 19 21:22 Dose:  650 mg


Al Hydrox/Mg Hydrox/Simethicone (Maalox)  30 ml PO Q4HR PRN


   PRN Reason: GI DISTRESS


   Stop: 10/21/19 21:31


Carvedilol (Coreg)  3.125 mg PO BID ORLY


   Stop: 10/21/19 22:14


   Last Admin: 19 17:10 Dose:  Not Given


Furosemide (Lasix)  20 mg PO DAILY ORLY


   Stop: 10/22/19 08:59


   Last Admin: 19 09:44 Dose:  20 mg


Haloperidol Lactate (Haldol)  2 mg IM Q6HR PRN


   PRN Reason: Agitation


   Stop: 10/21/19 21:40


Levothyroxine Sodium 0.1 mg/ (Levothyroxine Sodium 0.075 mg)  0.175 mg PO QDAC 

ORLY


   Stop: 10/28/19 07:29


   Last Admin: 19 06:33 Dose:  0.175 mg


Lidocaine (Lidoderm 5% Patch)  1 patch TD DAILY ORLY


   Stop: 10/30/19 08:59


   Last Admin: 19 09:48 Dose:  1 patch


Lorazepam (Ativan)  0.5 mg PO Q4HR PRN; Protocol


   PRN Reason: Anxiety


   Stop: 19 21:31


   Last Admin: 19 09:25 Dose:  0.5 mg


Magnesium Hydroxide (Milk Of Magnesia)  30 ml PO HS PRN


   PRN Reason: Constipation


Megestrol Acetate (Megace)  400 mg PO BID ORLY; Protocol


   Stop: 10/25/19 08:59


   Last Admin: 19 18:00 Dose:  400 mg


Mirtazapine (Remeron)  15 mg PO HS ORLY; Protocol


   Stop: 10/22/19 20:59


   Last Admin: 19 21:09 Dose:  15 mg


Multivitamins/Vitamin C (Theragran)  1 tab PO DAILY ORLY


   Stop: 10/22/19 08:59


   Last Admin: 19 09:47 Dose:  1 tab


Nystatin (Nystop)  100 units TP DAILY PRN


   PRN Reason: redness to breast/abdom. folds


   Stop: 10/25/19 16:57


   Last Admin: 19 08:56 Dose:  100 units


Pantoprazole Sodium (Protonix)  40 mg PO DAILY ORLY


   Stop: 10/22/19 08:59


   Last Admin: 19 09:47 Dose:  40 mg


Pregabalin (Lyrica)  50 mg PO BID ORLY


   Stop: 10/22/19 08:59


   Last Admin: 19 18:00 Dose:  50 mg


Quetiapine Fumarate (Seroquel)  25 mg PO HS ORLY; Protocol


   Stop: 10/22/19 20:59


   Last Admin: 19 21:09 Dose:  25 mg


Tramadol HCl (Ultram)  50 mg PO Q6HR PRN


   PRN Reason: Pain (Severe)


   Stop: 10/22/19 18:41


   Last Admin: 19 13:22 Dose:  50 mg


Trazodone HCl (Desyrel)  50 mg PO HS ORLY; Protocol


   Stop: 10/22/19 20:59


   Last Admin: 19 21:09 Dose:  50 mg


Zolpidem Tartrate (Ambien)  5 mg PO HS PRN


   PRN Reason: Insomnia


   Stop: 10/21/19 21:31


   Last Admin: 19 21:09 Dose:  5 mg








General: alert


HEENT: NC/AT, PERRLA, EOMI, anicteric sclerae, throat clear


Neck: Supple, No JVD, No thyromegaly, +2 carotid pulse wo bruit, No LAD


Cardiovascular: RRR, Normal S1, Normal S2, without murmur


Abdomen: soft, non-tender, non-distended


Extremities: clear


Neurological: no change





Internal Medicine Assmt/Plan





- Assessment


Assessment: 





1.HYPOTHYROIDISM.


2.OSEOARTHRITIS OF RT SHOULDER


3.MALNUTRITION.


4.SKIN LESION ON RT UPPER BACK .


5.RT SHOULDER PAIN.


6.PSYCHOSIS





- Plan


Plan: 





CONTINUE ON CURRENT MEDICATION AND DIET.





Nutritional Asmnt/Malnutr-PDOC





- Dietary Evaluation


Malnutrition Findings (Please click <Entered> for more info): 








Nutritional Asmnt/Malnutrition                             Start:  19 11:

46


Text:                                                      Status: Complete    

  


Freq:                                                                          

  


Protocol:                                                                      

  


 Document     19 11:46  NICOLA  (Rec: 19 11:55  NICOLA  [id number])


 Nutritional Asmnt/Malnutrition


     Patient General Information


      Nutritional Screening                      Moderate Risk


                                                 Consult


      Diagnosis                                  Psychosis NOS


      Pertinent Medical Hx/Surgical Hx           HTN, Diverticulosis, DJD,


                                                 hypothyroidism, GERD, Dementia


      Subjective Information                     Consult: low lab results


                                                 Pt is a 84-year-old female


                                                 admitted on  d/t


                                                 mechanical fall with ALOC. Pt


                                                 is eating 72% of meals x3 days


                                                 Per Meal/Nutrition Activity


                                                 Record. Pt currently receives


                                                 Ensure TID, d/t good PO intake


                                                 , the extra kcals and Pro are


                                                 unnecessary. Visited Pt at


                                                 bedside after breakfast time,


                                                 she was fast asleep- RN stated


                                                 she did not sleep well last


                                                 night. Recommended to RN,


                                                 Eleanor, to speak with MD


                                                 pertaining to the supplement


                                                 order.


                                                 HT: 5 FT


                                                 WT:140 LB (63.64 kg)


                                                 BMI: 27.37 (Overweight)


                                                 GI: Soft, Non-tender


                                                 BM: 8/26 x2


                                                 I/O: 120/Not Noted


                                                 Skin: Rash, burning, itching,


                                                 dryness, redness, flaking,


                                                 bruises. Excoriation in


                                                 buttocks with dryness and


                                                 peeling


                                                 Chao: 14


                                                 Diet Order: Cardiac, Chopped,


                                                 Ensure Enlive TID


                                                 Estimated Energy Needs: (


                                                 Geriatric, CBW)


                                                 8048-9005 kcals (25-30 kcals/


                                                 kg)


                                                 64-76g Pro (1.0-1.2 g/kg)


                                                 1503-4552 ml (25-30 ml/kg)


      Current Diet Order/ Nutrition Support      Cardiac, Chopped, Ensure


                                                 Enlive TID


      Pertinent Medications                      Maalox (PRN), Coreg, Lasix,


                                                 Synthroid, MOM (PRN), Megace,


                                                 Theragran, Protonix


      Pertinent Labs                             : Hgb/Hct 11.4/33.6, Na


                                                 131, BUN/Cr 7/0.5, Glucose 204


                                                 , Ca 8.4, AST 8, ALT 4, T Pro


                                                 5.6, Alb 2.9


     Nutritional Hx/Data


      Height                                     12.7 cm


      Height (Calculated Centimeters)            12.7


      Current Weight (lbs)                       63.503 kg


      Weight (Calculated Kilograms)              63.5


      Weight (Calculated Grams)                  53167.9


      Ideal Body Weight                          100 LB (45.45 kg)


      % Ideal Body Weight                        140


      Body Mass Index (BMI)                      3936.8


      Weight Status                              Overweight


     GI Symptoms


      GI Symptoms                                None


      Last BM                                    8/26 x2


      Skin Integrity/Comment:                    Skin: Rash, burning, itching,


                                                 dryness, redness, flaking,


                                                 bruises. Excoriation in


                                                 buttocks with dryness and


                                                 peeling


                                                 Chao: 14


      Current %PO                                Fair (50-74%)


     Estimated Nutritional Goals


      BEE in Kcals:                              Using Current wt


      Calories/Kcals/Kg                          3324-0058


      Kcals Calculated                           25-30


      Protein:                                   Using Current wt


      Protein g/k.0-1.2


      Protein Calculated                         64-76


      Fluid: ml                                  1423-0965 ml (25-30 ml/kg)


     Nutritional Problem


      1. Problem


       Problem                                   Altered nutrition related labs


       Etiology                                  r/t pathophysiological causes


       Signs/Symptoms:                           aeb : Hgb/Hct 11.4/33.6, Na


                                                 131, BUN/Cr 7/0.5, Glucose 204


                                                 , Ca 8.4, AST 8, ALT 4, T Pro


                                                 5.6, Alb 2.9


     Malnutrition Related to Morbid Obesity


      Malnutrition related to morbid obesity     No


     Intervention/Recommendation


      Comments                                   1. Continue with Cardiac,


                                                 Chopped, Ensure Enlive TID


                                                 diet as ordered.


                                                 2. Recommend d/c the Ensure


                                                 Enlive TID as PT PO intake is


                                                 adequate to estimated meet


                                                 nutritional needs.


     Expected Outcomes/Goals


      Expected Outcomes/Goals                    1. PO intake to meet 75% of


                                                 nutritional needs.


                                                 2. Monitor PO intake, wt,


                                                 nutrition related labs, and


                                                 skin integrity to trend WNL.


                                                 3. F/U as moderate risk in 3-5


                                                 days, -

## 2019-09-07 RX ADMIN — PANTOPRAZOLE SODIUM SCH MG: 40 TABLET, DELAYED RELEASE ORAL at 08:23

## 2019-09-07 NOTE — PROGRESS NOTES
DATE:  09/06/2019



SUBJECTIVE:  The patient is currently at her baseline.  We have not

unfortunately been able to get in touch with daughter to confirm placement.  We

have a bed for her to skilled nursing.  I have called multiple times as of

, no call back.  The patient remains confused, disoriented, but

calm, likely at her baseline.  Some periods of irritability.  Otherwise, no

agitation, no escalation of behaviors, ongoing confusional state.



ASSESSMENT:  The patient is calm, generally cooperative.  Nothing really further

to do from a psychiatric standpoint.  She appears comfortable.



We are pending a call back from daughter.





DD: 09/06/2019 12:14

DT: 09/07/2019 01:57

JOB# [id number]  [id number]

## 2019-09-07 NOTE — PROGRESS NOTES
DATE:     09/07/2019



Case was discussed with staff of the patient, reviewed records.  This is a

well-known case to me and seen her before covering for Dr. Patterson.  The staff is

working on placement for this patient.  They are trying to get her bed in a

skilled nursing facility.  The patient continues to be confused, disoriented,

but in general calmer with episodes of irritability.  No escalation of 
behavior.  No

side effects with the medication, no sedation, no nausea, no extrapyramidal

symptoms.  We will continue to work with the patient in group therapy, milieu

therapy, and adjust medication as needed.





DD: 09/07/2019 12:46

DT: 09/07/2019 17:44

JOB# [id number]  [id number]

MERVAT

## 2019-09-07 NOTE — INTERNAL MEDICINE PROG NOTE
Internal Medicine Subjective





- Subjective


Service Date: 19


Patient seen and examined:: without staff (SHE FEELS BETTER)


Patient is:: verbal, in bed, confused


Per staff patient has:: no adverse event





Internal Medicine Objective





- Results


Result Diagrams: 


 19 17:19





 19 17:19


Recent Labs: 


 Laboratory Last Values











WBC  6.4 Th/cmm (4.8-10.8)   19  17:19    


 


RBC  4.24 Mil/cmm (3.80-5.20)   19  17:19    


 


Hgb  11.4 gm/dL (12-16)  L  19  17:19    


 


Hct  33.6 % (41.0-60)  L  19  17:19    


 


MCV  79.1 fl ()  L  19  17:19    


 


MCH  26.8 pg (27.0-31.0)  L  19  17:19    


 


MCHC Differential  33.9 pg (28.0-36.0)   19  17:19    


 


RDW  16.0 % (11.5-20.0)   19  17:19    


 


Plt Count  466 Th/cmm (150-400)  H  19  17:19    


 


MPV  5.9 fl  19  17:19    


 


Neutrophils %  78.4 % (40.0-80.0)   19  17:19    


 


Lymphocytes %  11.1 % (20.0-50.0)  L  19  17:19    


 


Monocytes %  9.0 % (2.0-10.0)   19  17:19    


 


Eosinophils %  1.1 % (0.0-5.0)   19  17:19    


 


Basophils %  0.4 % (0.0-2.0)   19  17:19    


 


Sodium  131 mEq/L (136-145)  L  19  17:19    


 


Potassium  3.7 mEq/L (3.5-5.1)   19  17:19    


 


Chloride  97 mEq/L ()  L  19  17:19    


 


Carbon Dioxide  25.4 mEq/L (21.0-31.0)   19  17:19    


 


Anion Gap  12.3  (7.0-16.0)   19  17:19    


 


BUN  7 mg/dL (7-25)   19  17:19    


 


Creatinine  0.5 mg/dL (0.6-1.2)  L  19  17:19    


 


Est GFR ( Amer)  TNP   19  17:19    


 


Est GFR (Non-Af Amer)  TNP   19  17:19    


 


BUN/Creatinine Ratio  14.0   19  17:19    


 


Glucose  204 mg/dL ()  H  19  17:19    


 


Calcium  8.4 mg/dL (8.6-10.3)  L  19  17:19    


 


Total Bilirubin  0.3 mg/dL (0.3-1.0)   19  17:19    


 


AST  8 U/L (13-39)  L  19  17:19    


 


ALT  4 U/L (7-52)  L  19  17:19    


 


Alkaline Phosphatase  77 U/L ()   19  17:19    


 


Total Protein  5.6 gm/dL (6.0-8.3)  L  19  17:19    


 


Albumin  2.9 gm/dL (3.7-5.3)  L  19  17:19    


 


Globulin  2.7 gm/dL  19  17:19    


 


Albumin/Globulin Ratio  1.1  (1.0-1.8)   19  17:19    


 


Triglycerides  145 mg/dL (<150)   19  06:48    


 


Cholesterol  145 mg/dL (<200)   19  06:48    


 


LDL Cholesterol Direct  103 mg/dL ()   19  06:48    


 


HDL Cholesterol  27 mg/dL (23-92)   19  06:48    














- Physical Exam


Vitals and I&O: 


 Vital Signs











Temp  98 F   19 06:39


 


Pulse  100   19 08:23


 


Resp  20   19 08:00


 


BP  115/61   19 08:23


 


Pulse Ox  96   19 06:39








 Intake & Output











 19





 18:59 06:59 18:59


 


Intake Total 1300 360 


 


Output Total  2 


 


Balance 1300 358 


 


Intake:   


 


  Oral 1300 360 


 


Output:   


 


  Urine/Stool Mix  2 


 


Other:   


 


  # Voids 3 1 


 


  # Bowel Movements 0  


 


  Stool Characteristics Soft  





 Formed  





 Brown  











Active Medications: 


Current Medications





Acetaminophen (Tylenol)  650 mg PO Q4HR PRN


   PRN Reason: Mild Pain / Temp above 100


   Stop: 10/21/19 21:31


   Last Admin: 19 21:22 Dose:  650 mg


Al Hydrox/Mg Hydrox/Simethicone (Maalox)  30 ml PO Q4HR PRN


   PRN Reason: GI DISTRESS


   Stop: 10/21/19 21:31


Carvedilol (Coreg)  3.125 mg PO BID ORLY


   Stop: 10/21/19 22:14


   Last Admin: 19 08:23 Dose:  3.125 mg


Furosemide (Lasix)  20 mg PO DAILY ORLY


   Stop: 10/22/19 08:59


   Last Admin: 19 08:23 Dose:  20 mg


Haloperidol Lactate (Haldol)  2 mg IM Q6HR PRN


   PRN Reason: Agitation


   Stop: 10/21/19 21:40


Levothyroxine Sodium 0.1 mg/ (Levothyroxine Sodium 0.075 mg)  0.175 mg PO QDAC 

ORLY


   Stop: 10/28/19 07:29


   Last Admin: 19 06:40 Dose:  0.175 mg


Lidocaine (Lidoderm 5% Patch)  1 patch TD DAILY ORLY


   Stop: 10/30/19 08:59


   Last Admin: 19 08:21 Dose:  1 patch


Lorazepam (Ativan)  0.5 mg PO Q4HR PRN; Protocol


   PRN Reason: Anxiety


   Stop: 19 21:31


   Last Admin: 19 09:25 Dose:  0.5 mg


Magnesium Hydroxide (Milk Of Magnesia)  30 ml PO HS PRN


   PRN Reason: Constipation


Megestrol Acetate (Megace)  400 mg PO BID ORLY; Protocol


   Stop: 10/25/19 08:59


   Last Admin: 19 08:24 Dose:  400 mg


Mirtazapine (Remeron)  15 mg PO HS ORLY; Protocol


   Stop: 10/22/19 20:59


   Last Admin: 19 21:09 Dose:  15 mg


Multivitamins/Vitamin C (Theragran)  1 tab PO DAILY ORLY


   Stop: 10/22/19 08:59


   Last Admin: 19 08:23 Dose:  1 tab


Nystatin (Nystop)  100 units TP DAILY PRN


   PRN Reason: redness to breast/abdom. folds


   Stop: 10/25/19 16:57


   Last Admin: 19 08:56 Dose:  100 units


Pantoprazole Sodium (Protonix)  40 mg PO DAILY ORLY


   Stop: 10/22/19 08:59


   Last Admin: 19 08:23 Dose:  40 mg


Pregabalin (Lyrica)  50 mg PO BID ORLY


   Stop: 10/22/19 08:59


   Last Admin: 19 08:23 Dose:  50 mg


Quetiapine Fumarate (Seroquel)  25 mg PO HS ORLY; Protocol


   Stop: 10/22/19 20:59


   Last Admin: 19 21:09 Dose:  25 mg


Tramadol HCl (Ultram)  50 mg PO Q6HR PRN


   PRN Reason: Pain (Severe)


   Stop: 10/22/19 18:41


   Last Admin: 19 13:22 Dose:  50 mg


Trazodone HCl (Desyrel)  50 mg PO HS ORLY; Protocol


   Stop: 10/22/19 20:59


   Last Admin: 19 21:09 Dose:  50 mg


Zolpidem Tartrate (Ambien)  5 mg PO HS PRN


   PRN Reason: Insomnia


   Stop: 10/21/19 21:31


   Last Admin: 19 21:09 Dose:  5 mg








General: alert


HEENT: NC/AT, PERRLA, EOMI, anicteric sclerae, throat clear


Neck: Supple, No JVD, No thyromegaly, +2 carotid pulse wo bruit, No LAD


Cardiovascular: RRR, Normal S1, Normal S2, without murmur


Abdomen: soft, non-tender, non-distended


Extremities: clear


Neurological: no change





Internal Medicine Assmt/Plan





- Assessment


Assessment: 





1.HYPOTHYROIDISM.


2.OSEOARTHRITIS OF RT SHOULDER


3.MALNUTRITION.


4.SKIN LESION ON RT UPPER BACK .


5.RT SHOULDER PAIN.


6.PSYCHOSIS





- Plan


Plan: 





CONTINUE ON CURRENT MEDICATION AND DIET.





Nutritional Asmnt/Malnutr-PDOC





- Dietary Evaluation


Malnutrition Findings (Please click <Entered> for more info): 








Nutritional Asmnt/Malnutrition                             Start:  19 11:

46


Text:                                                      Status: Complete    

  


Freq:                                                                          

  


Protocol:                                                                      

  


 Document     19 11:46  NICOLA  (Rec: 19 11:55  NICOLA  [id number])


 Nutritional Asmnt/Malnutrition


     Patient General Information


      Nutritional Screening                      Moderate Risk


                                                 Consult


      Diagnosis                                  Psychosis NOS


      Pertinent Medical Hx/Surgical Hx           HTN, Diverticulosis, DJD,


                                                 hypothyroidism, GERD, Dementia


      Subjective Information                     Consult: low lab results


                                                 Pt is a 84-year-old female


                                                 admitted on  d/t


                                                 mechanical fall with ALOC. Pt


                                                 is eating 72% of meals x3 days


                                                 Per Meal/Nutrition Activity


                                                 Record. Pt currently receives


                                                 Ensure TID, d/t good PO intake


                                                 , the extra kcals and Pro are


                                                 unnecessary. Visited Pt at


                                                 bedside after breakfast time,


                                                 she was fast asleep- RN stated


                                                 she did not sleep well last


                                                 night. Recommended to RN,


                                                 Eleanor, to speak with MD


                                                 pertaining to the supplement


                                                 order.


                                                 HT: 5 FT


                                                 WT:140 LB (63.64 kg)


                                                 BMI: 27.37 (Overweight)


                                                 GI: Soft, Non-tender


                                                 BM: 8/26 x2


                                                 I/O: 120/Not Noted


                                                 Skin: Rash, burning, itching,


                                                 dryness, redness, flaking,


                                                 bruises. Excoriation in


                                                 buttocks with dryness and


                                                 peeling


                                                 Chao: 14


                                                 Diet Order: Cardiac, Chopped,


                                                 Ensure Enlive TID


                                                 Estimated Energy Needs: (


                                                 Geriatric, CBW)


                                                 6313-5955 kcals (25-30 kcals/


                                                 kg)


                                                 64-76g Pro (1.0-1.2 g/kg)


                                                 6748-2467 ml (25-30 ml/kg)


      Current Diet Order/ Nutrition Support      Cardiac, Chopped, Ensure


                                                 Enlive TID


      Pertinent Medications                      Maalox (PRN), Coreg, Lasix,


                                                 Synthroid, MOM (PRN), Megace,


                                                 Theragran, Protonix


      Pertinent Labs                             : Hgb/Hct 11.4/33.6, Na


                                                 131, BUN/Cr 7/0.5, Glucose 204


                                                 , Ca 8.4, AST 8, ALT 4, T Pro


                                                 5.6, Alb 2.9


     Nutritional Hx/Data


      Height                                     12.7 cm


      Height (Calculated Centimeters)            12.7


      Current Weight (lbs)                       63.503 kg


      Weight (Calculated Kilograms)              63.5


      Weight (Calculated Grams)                  40153.9


      Ideal Body Weight                          100 LB (45.45 kg)


      % Ideal Body Weight                        140


      Body Mass Index (BMI)                      3936.8


      Weight Status                              Overweight


     GI Symptoms


      GI Symptoms                                None


      Last BM                                    8/26 x2


      Skin Integrity/Comment:                    Skin: Rash, burning, itching,


                                                 dryness, redness, flaking,


                                                 bruises. Excoriation in


                                                 buttocks with dryness and


                                                 peeling


                                                 Chao: 14


      Current %PO                                Fair (50-74%)


     Estimated Nutritional Goals


      BEE in Kcals:                              Using Current wt


      Calories/Kcals/Kg                          2549-0388


      Kcals Calculated                           25-30


      Protein:                                   Using Current wt


      Protein g/k.0-1.2


      Protein Calculated                         64-76


      Fluid: ml                                  6594-5028 ml (25-30 ml/kg)


     Nutritional Problem


      1. Problem


       Problem                                   Altered nutrition related labs


       Etiology                                  r/t pathophysiological causes


       Signs/Symptoms:                           aeb : Hgb/Hct 11.4/33.6, Na


                                                 131, BUN/Cr 7/0.5, Glucose 204


                                                 , Ca 8.4, AST 8, ALT 4, T Pro


                                                 5.6, Alb 2.9


     Malnutrition Related to Morbid Obesity


      Malnutrition related to morbid obesity     No


     Intervention/Recommendation


      Comments                                   1. Continue with Cardiac,


                                                 Chopped, Ensure Enlive TID


                                                 diet as ordered.


                                                 2. Recommend d/c the Ensure


                                                 Enlive TID as PT PO intake is


                                                 adequate to estimated meet


                                                 nutritional needs.


     Expected Outcomes/Goals


      Expected Outcomes/Goals                    1. PO intake to meet 75% of


                                                 nutritional needs.


                                                 2. Monitor PO intake, wt,


                                                 nutrition related labs, and


                                                 skin integrity to trend WNL.


                                                 3. F/U as moderate risk in 3-5


                                                 days, -

## 2019-09-08 RX ADMIN — PANTOPRAZOLE SODIUM SCH MG: 40 TABLET, DELAYED RELEASE ORAL at 08:57

## 2019-09-08 NOTE — PROGRESS NOTES
DATE:  09/08/2019



Case was discussed with staff of the patient, reviewed records.  The patient

continues to have poor insight.  She is demented, confused, suspicious and

paranoid.  No side effects to the medication, no sedation, no nausea.  Unable to

make safe plan for self-care.  No placement yet for her.  We will continue to

work with the patient in group therapy, milieu therapy, and adjust the

medication as needed.





DD: 09/08/2019 13:02

DT: 09/08/2019 22:30

[facility]# [id number]  [id number]

## 2019-09-08 NOTE — INTERNAL MEDICINE PROG NOTE
Internal Medicine Subjective





- Subjective


Service Date: 19


Patient seen and examined:: without staff (she feels better)


Patient is:: verbal, in bed, confused


Per staff patient has:: no adverse event





Internal Medicine Objective





- Results


Result Diagrams: 


 19 17:19





 19 17:19


Recent Labs: 


 Laboratory Last Values











WBC  6.4 Th/cmm (4.8-10.8)   19  17:19    


 


RBC  4.24 Mil/cmm (3.80-5.20)   19  17:19    


 


Hgb  11.4 gm/dL (12-16)  L  19  17:19    


 


Hct  33.6 % (41.0-60)  L  19  17:19    


 


MCV  79.1 fl ()  L  19  17:19    


 


MCH  26.8 pg (27.0-31.0)  L  19  17:19    


 


MCHC Differential  33.9 pg (28.0-36.0)   19  17:19    


 


RDW  16.0 % (11.5-20.0)   19  17:19    


 


Plt Count  466 Th/cmm (150-400)  H  19  17:19    


 


MPV  5.9 fl  19  17:19    


 


Neutrophils %  78.4 % (40.0-80.0)   19  17:19    


 


Lymphocytes %  11.1 % (20.0-50.0)  L  19  17:19    


 


Monocytes %  9.0 % (2.0-10.0)   19  17:19    


 


Eosinophils %  1.1 % (0.0-5.0)   19  17:19    


 


Basophils %  0.4 % (0.0-2.0)   19  17:19    


 


Sodium  131 mEq/L (136-145)  L  19  17:19    


 


Potassium  3.7 mEq/L (3.5-5.1)   19  17:19    


 


Chloride  97 mEq/L ()  L  19  17:19    


 


Carbon Dioxide  25.4 mEq/L (21.0-31.0)   19  17:19    


 


Anion Gap  12.3  (7.0-16.0)   19  17:19    


 


BUN  7 mg/dL (7-25)   19  17:19    


 


Creatinine  0.5 mg/dL (0.6-1.2)  L  19  17:19    


 


Est GFR ( Amer)  TNP   19  17:19    


 


Est GFR (Non-Af Amer)  TNP   19  17:19    


 


BUN/Creatinine Ratio  14.0   19  17:19    


 


Glucose  204 mg/dL ()  H  19  17:19    


 


Calcium  8.4 mg/dL (8.6-10.3)  L  19  17:19    


 


Total Bilirubin  0.3 mg/dL (0.3-1.0)   19  17:19    


 


AST  8 U/L (13-39)  L  19  17:19    


 


ALT  4 U/L (7-52)  L  19  17:19    


 


Alkaline Phosphatase  77 U/L ()   19  17:19    


 


Total Protein  5.6 gm/dL (6.0-8.3)  L  19  17:19    


 


Albumin  2.9 gm/dL (3.7-5.3)  L  19  17:19    


 


Globulin  2.7 gm/dL  19  17:19    


 


Albumin/Globulin Ratio  1.1  (1.0-1.8)   19  17:19    


 


Triglycerides  145 mg/dL (<150)   19  06:48    


 


Cholesterol  145 mg/dL (<200)   19  06:48    


 


LDL Cholesterol Direct  103 mg/dL ()   19  06:48    


 


HDL Cholesterol  27 mg/dL (23-92)   19  06:48    














- Physical Exam


Vitals and I&O: 


 Vital Signs











Temp  97.6 F   19 14:02


 


Pulse  67   19 17:22


 


Resp  18   19 14:02


 


BP  123/85   19 17:22


 


Pulse Ox  98   19 14:02








 Intake & Output











 19





 18:59 06:59 18:59


 


Intake Total  120 


 


Balance  120 


 


Intake:   


 


  Oral  120 


 


Other:   


 


  # Voids 2 3 3


 


  # Bowel Movements 2  2











Active Medications: 


Current Medications





Acetaminophen (Tylenol)  650 mg PO Q4HR PRN


   PRN Reason: Mild Pain / Temp above 100


   Stop: 10/21/19 21:31


   Last Admin: 19 21:22 Dose:  650 mg


Al Hydrox/Mg Hydrox/Simethicone (Maalox)  30 ml PO Q4HR PRN


   PRN Reason: GI DISTRESS


   Stop: 10/21/19 21:31


Carvedilol (Coreg)  3.125 mg PO BID ORLY


   Stop: 10/21/19 22:14


   Last Admin: 19 17:22 Dose:  3.125 mg


Furosemide (Lasix)  20 mg PO DAILY ORLY


   Stop: 10/22/19 08:59


   Last Admin: 19 08:56 Dose:  Not Given


Haloperidol Lactate (Haldol)  2 mg IM Q6HR PRN


   PRN Reason: Agitation


   Stop: 10/21/19 21:40


Levothyroxine Sodium 0.1 mg/ (Levothyroxine Sodium 0.075 mg)  0.175 mg PO QDAC 

ORLY


   Stop: 10/28/19 07:29


   Last Admin: 19 06:52 Dose:  0.175 mg


Lidocaine (Lidoderm 5% Patch)  1 patch TD DAILY ORLY


   Stop: 10/30/19 08:59


   Last Admin: 19 09:08 Dose:  Not Given


Lorazepam (Ativan)  0.5 mg PO Q4HR PRN; Protocol


   PRN Reason: Anxiety


   Stop: 19 21:31


   Last Admin: 19 09:25 Dose:  0.5 mg


Magnesium Hydroxide (Milk Of Magnesia)  30 ml PO HS PRN


   PRN Reason: Constipation


Megestrol Acetate (Megace)  400 mg PO BID ORLY; Protocol


   Stop: 10/25/19 08:59


   Last Admin: 19 17:21 Dose:  400 mg


Mirtazapine (Remeron)  15 mg PO HS ORLY; Protocol


   Stop: 10/22/19 20:59


   Last Admin: 19 20:25 Dose:  15 mg


Multivitamins/Vitamin C (Theragran)  1 tab PO DAILY ORLY


   Stop: 10/22/19 08:59


   Last Admin: 19 08:57 Dose:  1 tab


Nystatin (Nystop)  100 units TP DAILY PRN


   PRN Reason: redness to breast/abdom. folds


   Stop: 10/25/19 16:57


   Last Admin: 19 08:56 Dose:  100 units


Pantoprazole Sodium (Protonix)  40 mg PO DAILY ORLY


   Stop: 10/22/19 08:59


   Last Admin: 19 08:57 Dose:  40 mg


Pregabalin (Lyrica)  50 mg PO BID ORLY


   Stop: 10/22/19 08:59


   Last Admin: 19 17:22 Dose:  50 mg


Quetiapine Fumarate (Seroquel)  25 mg PO HS ORLY; Protocol


   Stop: 10/22/19 20:59


   Last Admin: 19 20:25 Dose:  25 mg


Tramadol HCl (Ultram)  50 mg PO Q6HR PRN


   PRN Reason: Pain (Severe)


   Stop: 10/22/19 18:41


   Last Admin: 19 13:22 Dose:  50 mg


Trazodone HCl (Desyrel)  50 mg PO HS ORLY; Protocol


   Stop: 10/22/19 20:59


   Last Admin: 19 20:25 Dose:  50 mg


Zolpidem Tartrate (Ambien)  5 mg PO HS PRN


   PRN Reason: Insomnia


   Stop: 10/21/19 21:31


   Last Admin: 19 20:25 Dose:  5 mg








General: alert


HEENT: NC/AT, PERRLA, EOMI, anicteric sclerae, throat clear


Neck: Supple, No JVD, No thyromegaly, +2 carotid pulse wo bruit, No LAD


Cardiovascular: RRR, Normal S1, Normal S2, without murmur


Abdomen: soft, non-tender, non-distended


Extremities: clear


Neurological: no change





Internal Medicine Assmt/Plan





- Assessment


Assessment: 





1.HYPOTHYROIDISM.


2.OSEOARTHRITIS OF RT SHOULDER


3.MALNUTRITION.


4.SKIN LESION ON RT UPPER BACK .


5.RT SHOULDER PAIN.


6.PSYCHOSIS





- Plan


Plan: 





CONTINUE ON CURRENT MEDICATION AND DIET.





Nutritional Asmnt/Malnutr-PDOC





- Dietary Evaluation


Malnutrition Findings (Please click <Entered> for more info): 








Nutritional Asmnt/Malnutrition                             Start:  19 11:

46


Text:                                                      Status: Complete    

  


Freq:                                                                          

  


Protocol:                                                                      

  


 Document     19 11:46  NICOLA  (Rec: 19 11:55  NICOLA  [id number])


 Nutritional Asmnt/Malnutrition


     Patient General Information


      Nutritional Screening                      Moderate Risk


                                                 Consult


      Diagnosis                                  Psychosis NOS


      Pertinent Medical Hx/Surgical Hx           HTN, Diverticulosis, DJD,


                                                 hypothyroidism, GERD, Dementia


      Subjective Information                     Consult: low lab results


                                                 Pt is a 84-year-old female


                                                 admitted on  d/t


                                                 mechanical fall with ALOC. Pt


                                                 is eating 72% of meals x3 days


                                                 Per Meal/Nutrition Activity


                                                 Record. Pt currently receives


                                                 Ensure TID, d/t good PO intake


                                                 , the extra kcals and Pro are


                                                 unnecessary. Visited Pt at


                                                 bedside after breakfast time,


                                                 she was fast asleep- RN stated


                                                 she did not sleep well last


                                                 night. Recommended to RN,


                                                 Eleanor, to speak with MD


                                                 pertaining to the supplement


                                                 order.


                                                 HT: 5 FT


                                                 WT:140 LB (63.64 kg)


                                                 BMI: 27.37 (Overweight)


                                                 GI: Soft, Non-tender


                                                 BM: 8/26 x2


                                                 I/O: 120/Not Noted


                                                 Skin: Rash, burning, itching,


                                                 dryness, redness, flaking,


                                                 bruises. Excoriation in


                                                 buttocks with dryness and


                                                 peeling


                                                 Chao: 14


                                                 Diet Order: Cardiac, Chopped,


                                                 Ensure Enlive TID


                                                 Estimated Energy Needs: (


                                                 Geriatric, CBW)


                                                 3359-6448 kcals (25-30 kcals/


                                                 kg)


                                                 64-76g Pro (1.0-1.2 g/kg)


                                                 0400-5207 ml (25-30 ml/kg)


      Current Diet Order/ Nutrition Support      Cardiac, Chopped, Ensure


                                                 Enlive TID


      Pertinent Medications                      Maalox (PRN), Coreg, Lasix,


                                                 Synthroid, MOM (PRN), Megace,


                                                 Theragran, Protonix


      Pertinent Labs                             : Hgb/Hct 11.4/33.6, Na


                                                 131, BUN/Cr 7/0.5, Glucose 204


                                                 , Ca 8.4, AST 8, ALT 4, T Pro


                                                 5.6, Alb 2.9


     Nutritional Hx/Data


      Height                                     12.7 cm


      Height (Calculated Centimeters)            12.7


      Current Weight (lbs)                       63.503 kg


      Weight (Calculated Kilograms)              63.5


      Weight (Calculated Grams)                  69961.9


      Ideal Body Weight                          100 LB (45.45 kg)


      % Ideal Body Weight                        140


      Body Mass Index (BMI)                      3936.8


      Weight Status                              Overweight


     GI Symptoms


      GI Symptoms                                None


      Last BM                                    8/26 x2


      Skin Integrity/Comment:                    Skin: Rash, burning, itching,


                                                 dryness, redness, flaking,


                                                 bruises. Excoriation in


                                                 buttocks with dryness and


                                                 peeling


                                                 Chao: 14


      Current %PO                                Fair (50-74%)


     Estimated Nutritional Goals


      BEE in Kcals:                              Using Current wt


      Calories/Kcals/Kg                          7903-4029


      Kcals Calculated                           25-30


      Protein:                                   Using Current wt


      Protein g/k.0-1.2


      Protein Calculated                         64-76


      Fluid: ml                                  3244-0025 ml (25-30 ml/kg)


     Nutritional Problem


      1. Problem


       Problem                                   Altered nutrition related labs


       Etiology                                  r/t pathophysiological causes


       Signs/Symptoms:                           aeb : Hgb/Hct 11.4/33.6, Na


                                                 131, BUN/Cr 7/0.5, Glucose 204


                                                 , Ca 8.4, AST 8, ALT 4, T Pro


                                                 5.6, Alb 2.9


     Malnutrition Related to Morbid Obesity


      Malnutrition related to morbid obesity     No


     Intervention/Recommendation


      Comments                                   1. Continue with Cardiac,


                                                 Chopped, Ensure Enlive TID


                                                 diet as ordered.


                                                 2. Recommend d/c the Ensure


                                                 Enlive TID as PT PO intake is


                                                 adequate to estimated meet


                                                 nutritional needs.


     Expected Outcomes/Goals


      Expected Outcomes/Goals                    1. PO intake to meet 75% of


                                                 nutritional needs.


                                                 2. Monitor PO intake, wt,


                                                 nutrition related labs, and


                                                 skin integrity to trend WNL.


                                                 3. F/U as moderate risk in 3-5


                                                 days, -

## 2019-09-09 RX ADMIN — PANTOPRAZOLE SODIUM SCH MG: 40 TABLET, DELAYED RELEASE ORAL at 08:58

## 2019-09-09 NOTE — PROGRESS NOTES
DATE:  09/08/2019



SURGICAL PROGRESS NOTE



TIME:  3:11 p.m.



OBJECTIVE:

GENERAL:  Afebrile.

VITAL SIGNS:  Stable.

CHEST:  Clear to auscultation bilaterally.

HEART:  Regular rhythm and rate.

ABDOMEN:  Soft, nontender, nondistended.

EXTREMITIES:  Right posterior shoulder, back suspicious skin lesion still

present.  No active bleeding.  No further complications.



The patient has no recent labs.



IMPRESSION AND PLAN:  Reportedly, patient was to be discharged, but the patient

still here in the hospital in the Geropsych Unit.  The patient has indeterminate

suspicious right upper back suspicious skin lesion and recommend excisional

biopsy of this.  Since the patient is still an inpatient now, perhaps can

schedule an excisional biopsy of this right upper back suspicious skin lesion

here at [facility], if there are still OR and Anesthesia

Services here, if not, certainly can plan this excisional biopsy as an

outpatient on an elective basis as well.  Other option would be to transfer the

patient to [facility] and schedule the excisional biopsy there as

another option.  We will talk with the  and nursing supervisor on

Monday.





DD: 09/08/2019 15:13

DT: 09/08/2019 23:31

JOB# [id number]  [id number]

## 2019-09-10 NOTE — PROGRESS NOTES
DATE:  09/09/2019



SUBJECTIVE:  The patient in the hospital, calm, generally cooperative. 

Placement confirmed.  Remains somewhat confused, ongoing disorientation, but no

overt SI or HI, no psychotic symptoms.  Sleeping well, eating well.  Staff

noting she has been generally calmer.



PLAN:  We will discharge today.





DD: 09/09/2019 13:40

DT: 09/10/2019 02:58

JOB# [id number]  [id number]

## 2020-01-12 NOTE — NUR
DR. YULISSA LEAL AT BEDSIDE TO EVALUATE PT. NO NEW ORDERS RECEIVED AT THIS TIME. WILL CONTINUE TO MONITOR 
PT. Orthopedics

## 2022-07-24 NOTE — INTERNAL MEDICINE PROG NOTE
Internal Medicine Subjective





- Subjective


Service Date: 19


Patient seen and examined:: with staff (SHE HAS RT SHOULDER PAIN AND SKIN 

LESION ON RT UPPER BACK SKIN)


Patient is:: verbal, in bed, confused


Per staff patient has:: no adverse event





Internal Medicine Objective





- Results


Result Diagrams: 


 19 17:19





 19 17:19


Recent Labs: 


 Laboratory Last Values











WBC  6.4 Th/cmm (4.8-10.8)   19  17:19    


 


RBC  4.24 Mil/cmm (3.80-5.20)   19  17:19    


 


Hgb  11.4 gm/dL (12-16)  L  19  17:19    


 


Hct  33.6 % (41.0-60)  L  19  17:19    


 


MCV  79.1 fl ()  L  19  17:19    


 


MCH  26.8 pg (27.0-31.0)  L  19  17:19    


 


MCHC Differential  33.9 pg (28.0-36.0)   19  17:19    


 


RDW  16.0 % (11.5-20.0)   19  17:19    


 


Plt Count  466 Th/cmm (150-400)  H  19  17:19    


 


MPV  5.9 fl  19  17:19    


 


Neutrophils %  78.4 % (40.0-80.0)   19  17:19    


 


Lymphocytes %  11.1 % (20.0-50.0)  L  19  17:19    


 


Monocytes %  9.0 % (2.0-10.0)   19  17:19    


 


Eosinophils %  1.1 % (0.0-5.0)   19  17:19    


 


Basophils %  0.4 % (0.0-2.0)   19  17:19    


 


Sodium  131 mEq/L (136-145)  L  19  17:19    


 


Potassium  3.7 mEq/L (3.5-5.1)   19  17:19    


 


Chloride  97 mEq/L ()  L  19  17:19    


 


Carbon Dioxide  25.4 mEq/L (21.0-31.0)   19  17:19    


 


Anion Gap  12.3  (7.0-16.0)   19  17:19    


 


BUN  7 mg/dL (7-25)   19  17:19    


 


Creatinine  0.5 mg/dL (0.6-1.2)  L  19  17:19    


 


Est GFR ( Amer)  TNP   19  17:19    


 


Est GFR (Non-Af Amer)  TNP   19  17:19    


 


BUN/Creatinine Ratio  14.0   19  17:19    


 


Glucose  204 mg/dL ()  H  19  17:19    


 


Calcium  8.4 mg/dL (8.6-10.3)  L  19  17:19    


 


Total Bilirubin  0.3 mg/dL (0.3-1.0)   19  17:19    


 


AST  8 U/L (13-39)  L  19  17:19    


 


ALT  4 U/L (7-52)  L  19  17:19    


 


Alkaline Phosphatase  77 U/L ()   19  17:19    


 


Total Protein  5.6 gm/dL (6.0-8.3)  L  19  17:19    


 


Albumin  2.9 gm/dL (3.7-5.3)  L  19  17:19    


 


Globulin  2.7 gm/dL  19  17:19    


 


Albumin/Globulin Ratio  1.1  (1.0-1.8)   19  17:19    


 


Triglycerides  145 mg/dL (<150)   19  06:48    


 


Cholesterol  145 mg/dL (<200)   19  06:48    


 


LDL Cholesterol Direct  103 mg/dL ()   19  06:48    


 


HDL Cholesterol  27 mg/dL (23-92)   19  06:48    














- Physical Exam


Vitals and I&O: 


 Vital Signs











Temp  97.9 F   19 14:00


 


Pulse  117   19 17:44


 


Resp  20   19 14:00


 


BP  105/55   19 17:44


 


Pulse Ox  96   19 14:00








 Intake & Output











 19





 18:59 06:59 18:59


 


Intake Total 800 240 


 


Balance 800 240 


 


Intake:   


 


  Oral 800 240 


 


Other:   


 


  # Voids 4 2 


 


  # Bowel Movements 2 0 


 


  Stool Characteristics   Formed





   Brown











Active Medications: 


Current Medications





Acetaminophen (Tylenol)  650 mg PO Q4HR PRN


   PRN Reason: Mild Pain / Temp above 100


   Stop: 10/21/19 21:31


   Last Admin: 19 20:38 Dose:  650 mg


Al Hydrox/Mg Hydrox/Simethicone (Maalox)  30 ml PO Q4HR PRN


   PRN Reason: GI DISTRESS


   Stop: 10/21/19 21:31


Carvedilol (Coreg)  3.125 mg PO BID ORLY


   Stop: 10/21/19 22:14


   Last Admin: 19 17:44 Dose:  Not Given


Furosemide (Lasix)  20 mg PO DAILY ORLY


   Stop: 10/22/19 08:59


   Last Admin: 19 08:55 Dose:  20 mg


Haloperidol Lactate (Haldol)  2 mg IM Q6HR PRN


   PRN Reason: Agitation


   Stop: 10/21/19 21:40


Levothyroxine Sodium 0.1 mg/ (Levothyroxine Sodium 0.075 mg)  0.175 mg PO QDAC 

ORLY


   Stop: 10/28/19 07:29


   Last Admin: 19 06:40 Dose:  0.175 mg


Lorazepam (Ativan)  0.5 mg PO Q4HR PRN; Protocol


   PRN Reason: Anxiety


   Stop: 19 21:31


Magnesium Hydroxide (Milk Of Magnesia)  30 ml PO HS PRN


   PRN Reason: Constipation


Megestrol Acetate (Megace)  400 mg PO BID ORLY; Protocol


   Stop: 10/25/19 08:59


   Last Admin: 19 17:43 Dose:  400 mg


Mirtazapine (Remeron)  15 mg PO HS ORLY; Protocol


   Stop: 10/22/19 20:59


   Last Admin: 19 20:37 Dose:  15 mg


Multivitamins/Vitamin C (Theragran)  1 tab PO DAILY ORLY


   Stop: 10/22/19 08:59


   Last Admin: 19 08:56 Dose:  1 tab


Nystatin (Mycostatin Cream)  1 appl TP DAILY ORLY


   Stop: 19 08:59


   Last Admin: 19 08:58 Dose:  Not Given


Nystatin (Nystop)  100 units TP DAILY PRN


   PRN Reason: redness to breast/abdom. folds


   Stop: 10/25/19 16:57


   Last Admin: 19 08:56 Dose:  100 units


Pantoprazole Sodium (Protonix)  40 mg PO DAILY ORLY


   Stop: 10/22/19 08:59


   Last Admin: 19 08:56 Dose:  40 mg


Pregabalin (Lyrica)  50 mg PO BID ORLY


   Stop: 10/22/19 08:59


   Last Admin: 19 17:43 Dose:  50 mg


Quetiapine Fumarate (Seroquel)  25 mg PO HS ORLY; Protocol


   Stop: 10/22/19 20:59


   Last Admin: 19 20:37 Dose:  25 mg


Tramadol HCl (Ultram)  50 mg PO Q6HR PRN


   PRN Reason: Pain (Severe)


   Stop: 10/22/19 18:41


   Last Admin: 19 11:42 Dose:  50 mg


Trazodone HCl (Desyrel)  50 mg PO HS ORLY; Protocol


   Stop: 10/22/19 20:59


   Last Admin: 19 20:38 Dose:  50 mg


Zolpidem Tartrate (Ambien)  5 mg PO HS PRN


   PRN Reason: Insomnia


   Stop: 10/21/19 21:31








General: alert


HEENT: NC/AT, PERRLA, EOMI, anicteric sclerae, throat clear


Neck: Supple, No JVD, No thyromegaly, +2 carotid pulse wo bruit, No LAD


Cardiovascular: RRR, Normal S1, Normal S2, without murmur


Abdomen: soft, non-tender, non-distended


Extremities: clear


Neurological: no change


Other physical findings: 





THERE IS PAIN ON RT SHOULDR MOVEMENT,NO DEFORMETY.THERE IS A SKIN LESION 3X3 CM 

ON RT UPPER BACK SKIN.





Internal Medicine Assmt/Plan





- Assessment


Assessment: 





1.HYPOTHYROIDISM.


2.DJD.


3.MALNUTRITION.


4.SKIN LESION ON RT UPPER BACK .


5.RT SHOULDER PAIN.


6.PSYCHOSIS





- Plan


Plan: 





CONTINUE ON CURRENT MEDICATION AND DIET.XRA FOR RT SHOULDER.CONSULT  FOR 

EVALUATION THE SKIN LESION.





Nutritional Asmnt/Malnutr-PDOC





- Dietary Evaluation


Malnutrition Findings (Please click <Entered> for more info): 








Nutritional Asmnt/Malnutrition                             Start:  19 11:

46


Text:                                                      Status: Complete    

  


Freq:                                                                          

  


Protocol:                                                                      

  


 Document     19 11:46  NICOLA  (Rec: 19 11:55  NICOLA [id number])


 Nutritional Asmnt/Malnutrition


     Patient General Information


      Nutritional Screening                      Moderate Risk


                                                 Consult


      Diagnosis                                  Psychosis NOS


      Pertinent Medical Hx/Surgical Hx           HTN, Diverticulosis, DJD,


                                                 hypothyroidism, GERD, Dementia


      Subjective Information                     Consult: low lab results


                                                 Pt is a 84-year-old female


                                                 admitted on  d/t


                                                 mechanical fall with ALOC. Pt


                                                 is eating 72% of meals x3 days


                                                 Per Meal/Nutrition Activity


                                                 Record. Pt currently receives


                                                 Ensure TID, d/t good PO intake


                                                 , the extra kcals and Pro are


                                                 unnecessary. Visited Pt at


                                                 bedside after breakfast time,


                                                 she was fast asleep- RN stated


                                                 she did not sleep well last


                                                 night. Recommended to RNEleanor, to speak with MD


                                                 pertaining to the supplement


                                                 order.


                                                 HT: 5 FT


                                                 WT:140 LB (63.64 kg)


                                                 BMI: 27.37 (Overweight)


                                                 GI: Soft, Non-tender


                                                 BM: 8/26 x2


                                                 I/O: 120/Not Noted


                                                 Skin: Rash, burning, itching,


                                                 dryness, redness, flaking,


                                                 bruises. Excoriation in


                                                 buttocks with dryness and


                                                 peeling


                                                 Chao: 14


                                                 Diet Order: Cardiac, Chopped,


                                                 Ensure Enlive TID


                                                 Estimated Energy Needs: (


                                                 Geriatric, CBW)


                                                 0616-1686 kcals (25-30 kcals/


                                                 kg)


                                                 64-76g Pro (1.0-1.2 g/kg)


                                                 7524-7856 ml (25-30 ml/kg)


      Current Diet Order/ Nutrition Support      Cardiac, Chopped, Ensure


                                                 Enlive TID


      Pertinent Medications                      Maalox (PRN), Coreg, Lasix,


                                                 Synthroid, MOM (PRN), Megace,


                                                 Theragran, Protonix


      Pertinent Labs                             : Hgb/Hct 11.4/33.6, Na


                                                 131, BUN/Cr 7/0.5, Glucose 204


                                                 , Ca 8.4, AST 8, ALT 4, T Pro


                                                 5.6, Alb 2.9


     Nutritional Hx/Data


      Height                                     12.7 cm


      Height (Calculated Centimeters)            12.7


      Current Weight (lbs)                       63.503 kg


      Weight (Calculated Kilograms)              63.5


      Weight (Calculated Grams)                  55813.9


      Ideal Body Weight                          100 LB (45.45 kg)


      % Ideal Body Weight                        140


      Body Mass Index (BMI)                      3936.8


      Weight Status                              Overweight


     GI Symptoms


      GI Symptoms                                None


      Last BM                                    8/26 x2


      Skin Integrity/Comment:                    Skin: Rash, burning, itching,


                                                 dryness, redness, flaking,


                                                 bruises. Excoriation in


                                                 buttocks with dryness and


                                                 peeling


                                                 Chao: 14


      Current %PO                                Fair (50-74%)


     Estimated Nutritional Goals


      BEE in Kcals:                              Using Current wt


      Calories/Kcals/Kg                          0807-0297


      Kcals Calculated                           25-30


      Protein:                                   Using Current wt


      Protein g/k.0-1.2


      Protein Calculated                         64-76


      Fluid: ml                                  7474-3174 ml (25-30 ml/kg)


     Nutritional Problem


      1. Problem


       Problem                                   Altered nutrition related labs


       Etiology                                  r/t pathophysiological causes


       Signs/Symptoms:                           aeb : Hgb/Hct 11.4/33.6, Na


                                                 131, BUN/Cr 7/0.5, Glucose 204


                                                 , Ca 8.4, AST 8, ALT 4, T Pro


                                                 5.6, Alb 2.9


     Malnutrition Related to Morbid Obesity


      Malnutrition related to morbid obesity     No


     Intervention/Recommendation


      Comments                                   1. Continue with Cardiac,


                                                 Chopped, Ensure Enlive TID


                                                 diet as ordered.


                                                 2. Recommend d/c the Ensure


                                                 Enlive TID as PT PO intake is


                                                 adequate to estimated meet


                                                 nutritional needs.


     Expected Outcomes/Goals


      Expected Outcomes/Goals                    1. PO intake to meet 75% of


                                                 nutritional needs.


                                                 2. Monitor PO intake, wt,


                                                 nutrition related labs, and


                                                 skin integrity to trend WNL.


                                                 3. F/U as moderate risk in 3-5


                                                 days, - none